# Patient Record
Sex: MALE | Race: WHITE | NOT HISPANIC OR LATINO | URBAN - METROPOLITAN AREA
[De-identification: names, ages, dates, MRNs, and addresses within clinical notes are randomized per-mention and may not be internally consistent; named-entity substitution may affect disease eponyms.]

---

## 2019-06-22 ENCOUNTER — INPATIENT (INPATIENT)
Facility: HOSPITAL | Age: 59
LOS: 8 days | Discharge: HOME HEALTH | DRG: 234 | End: 2019-07-01
Attending: THORACIC SURGERY (CARDIOTHORACIC VASCULAR SURGERY) | Admitting: THORACIC SURGERY (CARDIOTHORACIC VASCULAR SURGERY)
Payer: COMMERCIAL

## 2019-06-22 VITALS
SYSTOLIC BLOOD PRESSURE: 127 MMHG | RESPIRATION RATE: 18 BRPM | HEART RATE: 50 BPM | TEMPERATURE: 98 F | DIASTOLIC BLOOD PRESSURE: 80 MMHG | OXYGEN SATURATION: 97 %

## 2019-06-22 DIAGNOSIS — Z98.890 OTHER SPECIFIED POSTPROCEDURAL STATES: Chronic | ICD-10-CM

## 2019-06-22 LAB
ALBUMIN SERPL ELPH-MCNC: 4.2 G/DL — SIGNIFICANT CHANGE UP (ref 3.4–5)
ALP SERPL-CCNC: 77 U/L — SIGNIFICANT CHANGE UP (ref 40–120)
ALT FLD-CCNC: 26 U/L — SIGNIFICANT CHANGE UP (ref 12–42)
ANION GAP SERPL CALC-SCNC: 13 MMOL/L — SIGNIFICANT CHANGE UP (ref 9–16)
APTT BLD: 31.3 SEC — SIGNIFICANT CHANGE UP (ref 27.5–36.3)
AST SERPL-CCNC: 24 U/L — SIGNIFICANT CHANGE UP (ref 15–37)
BASOPHILS NFR BLD AUTO: 0.3 % — SIGNIFICANT CHANGE UP (ref 0–2)
BILIRUB SERPL-MCNC: 0.4 MG/DL — SIGNIFICANT CHANGE UP (ref 0.2–1.2)
BUN SERPL-MCNC: 20 MG/DL — SIGNIFICANT CHANGE UP (ref 7–23)
CALCIUM SERPL-MCNC: 9.5 MG/DL — SIGNIFICANT CHANGE UP (ref 8.5–10.5)
CHLORIDE SERPL-SCNC: 107 MMOL/L — SIGNIFICANT CHANGE UP (ref 96–108)
CO2 SERPL-SCNC: 22 MMOL/L — SIGNIFICANT CHANGE UP (ref 22–31)
CREAT SERPL-MCNC: 1.02 MG/DL — SIGNIFICANT CHANGE UP (ref 0.5–1.3)
D DIMER BLD IA.RAPID-MCNC: 419 NG/ML DDU — HIGH
EOSINOPHIL NFR BLD AUTO: 1.8 % — SIGNIFICANT CHANGE UP (ref 0–6)
GLUCOSE SERPL-MCNC: 100 MG/DL — HIGH (ref 70–99)
HCT VFR BLD CALC: 43.1 % — SIGNIFICANT CHANGE UP (ref 39–50)
HGB BLD-MCNC: 14.6 G/DL — SIGNIFICANT CHANGE UP (ref 13–17)
IMM GRANULOCYTES NFR BLD AUTO: 0.3 % — SIGNIFICANT CHANGE UP (ref 0–1.5)
INR BLD: 1 — SIGNIFICANT CHANGE UP (ref 0.88–1.16)
LIDOCAIN IGE QN: 145 U/L — SIGNIFICANT CHANGE UP (ref 73–393)
LYMPHOCYTES # BLD AUTO: 25.4 % — SIGNIFICANT CHANGE UP (ref 13–44)
MCHC RBC-ENTMCNC: 29.9 PG — SIGNIFICANT CHANGE UP (ref 27–34)
MCHC RBC-ENTMCNC: 33.9 G/DL — SIGNIFICANT CHANGE UP (ref 32–36)
MCV RBC AUTO: 88.3 FL — SIGNIFICANT CHANGE UP (ref 80–100)
MONOCYTES NFR BLD AUTO: 9.5 % — SIGNIFICANT CHANGE UP (ref 2–14)
NEUTROPHILS NFR BLD AUTO: 62.7 % — SIGNIFICANT CHANGE UP (ref 43–77)
NT-PROBNP SERPL-SCNC: 41 PG/ML — SIGNIFICANT CHANGE UP
PLATELET # BLD AUTO: 272 K/UL — SIGNIFICANT CHANGE UP (ref 150–400)
POTASSIUM SERPL-MCNC: 4.1 MMOL/L — SIGNIFICANT CHANGE UP (ref 3.5–5.3)
POTASSIUM SERPL-SCNC: 4.1 MMOL/L — SIGNIFICANT CHANGE UP (ref 3.5–5.3)
PROT SERPL-MCNC: 7.7 G/DL — SIGNIFICANT CHANGE UP (ref 6.4–8.2)
PROTHROM AB SERPL-ACNC: 11.1 SEC — SIGNIFICANT CHANGE UP (ref 10–12.9)
RBC # BLD: 4.88 M/UL — SIGNIFICANT CHANGE UP (ref 4.2–5.8)
RBC # FLD: 13.1 % — SIGNIFICANT CHANGE UP (ref 10.3–14.5)
SODIUM SERPL-SCNC: 142 MMOL/L — SIGNIFICANT CHANGE UP (ref 132–145)
TROPONIN I SERPL-MCNC: 0.02 NG/ML — SIGNIFICANT CHANGE UP (ref 0.02–0.06)
TROPONIN I SERPL-MCNC: 0.29 NG/ML — HIGH (ref 0.02–0.06)
WBC # BLD: 8.7 K/UL — SIGNIFICANT CHANGE UP (ref 3.8–10.5)
WBC # FLD AUTO: 8.7 K/UL — SIGNIFICANT CHANGE UP (ref 3.8–10.5)

## 2019-06-22 PROCEDURE — 99285 EMERGENCY DEPT VISIT HI MDM: CPT | Mod: 25

## 2019-06-22 PROCEDURE — 93010 ELECTROCARDIOGRAM REPORT: CPT | Mod: NC

## 2019-06-22 PROCEDURE — 71275 CT ANGIOGRAPHY CHEST: CPT | Mod: 26

## 2019-06-22 PROCEDURE — 71046 X-RAY EXAM CHEST 2 VIEWS: CPT | Mod: 26

## 2019-06-22 RX ORDER — HEPARIN SODIUM 5000 [USP'U]/ML
4000 INJECTION INTRAVENOUS; SUBCUTANEOUS ONCE
Refills: 0 | Status: COMPLETED | OUTPATIENT
Start: 2019-06-22 | End: 2019-06-22

## 2019-06-22 RX ORDER — HEPARIN SODIUM 5000 [USP'U]/ML
INJECTION INTRAVENOUS; SUBCUTANEOUS
Qty: 25000 | Refills: 0 | Status: DISCONTINUED | OUTPATIENT
Start: 2019-06-22 | End: 2019-06-23

## 2019-06-22 RX ORDER — ASPIRIN/CALCIUM CARB/MAGNESIUM 324 MG
325 TABLET ORAL ONCE
Refills: 0 | Status: COMPLETED | OUTPATIENT
Start: 2019-06-22 | End: 2019-06-22

## 2019-06-22 RX ORDER — HEPARIN SODIUM 5000 [USP'U]/ML
4000 INJECTION INTRAVENOUS; SUBCUTANEOUS EVERY 6 HOURS
Refills: 0 | Status: DISCONTINUED | OUTPATIENT
Start: 2019-06-22 | End: 2019-06-24

## 2019-06-22 RX ORDER — ATORVASTATIN CALCIUM 80 MG/1
80 TABLET, FILM COATED ORAL ONCE
Refills: 0 | Status: COMPLETED | OUTPATIENT
Start: 2019-06-22 | End: 2019-06-22

## 2019-06-22 RX ORDER — FAMOTIDINE 10 MG/ML
20 INJECTION INTRAVENOUS ONCE
Refills: 0 | Status: COMPLETED | OUTPATIENT
Start: 2019-06-22 | End: 2019-06-22

## 2019-06-22 RX ADMIN — ATORVASTATIN CALCIUM 80 MILLIGRAM(S): 80 TABLET, FILM COATED ORAL at 23:19

## 2019-06-22 RX ADMIN — HEPARIN SODIUM 1000 UNIT(S)/HR: 5000 INJECTION INTRAVENOUS; SUBCUTANEOUS at 23:20

## 2019-06-22 RX ADMIN — Medication 30 MILLILITER(S): at 20:27

## 2019-06-22 RX ADMIN — HEPARIN SODIUM 4000 UNIT(S): 5000 INJECTION INTRAVENOUS; SUBCUTANEOUS at 23:20

## 2019-06-22 RX ADMIN — Medication 325 MILLIGRAM(S): at 18:34

## 2019-06-22 RX ADMIN — FAMOTIDINE 20 MILLIGRAM(S): 10 INJECTION INTRAVENOUS at 20:27

## 2019-06-22 NOTE — ED PROVIDER NOTE - DIAGNOSTIC INTERPRETATION
ER Physician: Dr. Shaffer   CXR (2 views) INTERPRETATION: lungs clear, heart shadow normal, bony structures intact

## 2019-06-22 NOTE — ED ADULT NURSE NOTE - CHPI ED NUR SYMPTOMS NEG
no dizziness/no diaphoresis/no syncope/no chills/no shortness of breath/no congestion/no vomiting/no fever/no back pain/no nausea

## 2019-06-22 NOTE — ED PROVIDER NOTE - PROGRESS NOTE DETAILS
unchanged EKG, sinus izabel, RBBB. Will wait for second trop for dispo. pt feels better after GI cocktail. troponin elevated significantly on repeat, will admit for R/O ACS. Explained to pt, understands and agreeable w  plan. Started heparin drip, dose of lipitor. Discussed w Dr Hamilton from Cardio who accepts admission under his name to Cardio Tele. troponin elevated significantly on repeat, will admit for R/O ACS. Explained to pt, understands and agreeable w  plan. Started heparin drip, dose of lipitor. Discussed w Dr Mejias from Cardio who accepts admission under his name to Cardio Tele.

## 2019-06-22 NOTE — ED ADULT NURSE REASSESSMENT NOTE - NS ED NURSE REASSESS COMMENT FT1
Received patient from [facility]. Patient resting in stretcher, nad on cardiac telemetry. AOx4, states "CP is better, feels more gassy now". Denies SoB, headache, dizziness, N/V. MD made aware, will continue to monitor.

## 2019-06-22 NOTE — ED ADULT NURSE NOTE - OBJECTIVE STATEMENT
59 y/o male c/o chest pain non radiating- pt denies sob, dizziness at this time- Pt is A+Ox3 and states his brother who is 18 months older than him had a MI recently

## 2019-06-22 NOTE — ED PROVIDER NOTE - OBJECTIVE STATEMENT
57 y/o M with PMHx of HTN and ADHD presents to ED with c/o chest discomfort since 1 hour ago. Pt describes Sx as a burning / pressure sensation. Pt reports Sx began when he was walking today and went to sit down for a pedicure. He states he also experienced sweating after sitting down. Pt also states he had similar Sx on Thursday, but was not as intense. Pt denies any SOB or any smoking Hx. Pt admits Sx has subsided significantly on exam.     Pt takes Losartan and Adermal. 57 y/o M with PMHx of HTN and ADHD presents to ED with c/o chest discomfort since 1 hour ago. Pt describes Sx as a burning / pressure sensation. Pt reports Sx began when he was walking today and went to sit down for a pedicure. He states he also experienced sweating after sitting down. Pt also states he had similar Sx on Thursday, but was not as intense. Pt denies any SOB or any smoking Hx. Pt admits Sx has subsided significantly on exam.     Pt takes Losartan and adderal.

## 2019-06-22 NOTE — ED ADULT TRIAGE NOTE - CHIEF COMPLAINT QUOTE
pt states midsternal chest pain/ burning. states no cardiac hx  brother around the same age had a stent placed

## 2019-06-22 NOTE — ED PROVIDER NOTE - CLINICAL SUMMARY MEDICAL DECISION MAKING FREE TEXT BOX
57 y/o M presenting with c/o chest discomfort for at least 1 hour PTA. Will do CP workup including troponin and D-dimers.  Pt is well appearing cornel. Explained to Pt possibility of trending troponin given early presentation. Will order a dose of ASA for possible ACS and order a CXR. Will reassess afterwards. 59 y/o M presenting with c/o chest discomfort for at least 1 hour PTA. Will do CP workup including troponin and D-dimers.  Pt is well appearing cornel. Explained to Pt possibility of trending troponin given early presentation. Will order a dose of ASA for possible ACS.   Update: D-dimer is elevated. Will order CTA chest to r/o PE.

## 2019-06-23 DIAGNOSIS — I10 ESSENTIAL (PRIMARY) HYPERTENSION: ICD-10-CM

## 2019-06-23 DIAGNOSIS — Z29.9 ENCOUNTER FOR PROPHYLACTIC MEASURES, UNSPECIFIED: ICD-10-CM

## 2019-06-23 DIAGNOSIS — R73.03 PREDIABETES: ICD-10-CM

## 2019-06-23 DIAGNOSIS — I24.9 ACUTE ISCHEMIC HEART DISEASE, UNSPECIFIED: ICD-10-CM

## 2019-06-23 DIAGNOSIS — F90.9 ATTENTION-DEFICIT HYPERACTIVITY DISORDER, UNSPECIFIED TYPE: ICD-10-CM

## 2019-06-23 LAB
ALBUMIN SERPL ELPH-MCNC: 4.1 G/DL — SIGNIFICANT CHANGE UP (ref 3.3–5)
ALP SERPL-CCNC: 67 U/L — SIGNIFICANT CHANGE UP (ref 40–120)
ALT FLD-CCNC: 17 U/L — SIGNIFICANT CHANGE UP (ref 10–45)
ANION GAP SERPL CALC-SCNC: 10 MMOL/L — SIGNIFICANT CHANGE UP (ref 5–17)
APTT BLD: 105 SEC — HIGH (ref 27.5–36.3)
APTT BLD: 33.5 SEC — SIGNIFICANT CHANGE UP (ref 27.5–36.3)
APTT BLD: 47.9 SEC — HIGH (ref 27.5–36.3)
AST SERPL-CCNC: 21 U/L — SIGNIFICANT CHANGE UP (ref 10–40)
BILIRUB SERPL-MCNC: 0.6 MG/DL — SIGNIFICANT CHANGE UP (ref 0.2–1.2)
BUN SERPL-MCNC: 14 MG/DL — SIGNIFICANT CHANGE UP (ref 7–23)
CALCIUM SERPL-MCNC: 9.1 MG/DL — SIGNIFICANT CHANGE UP (ref 8.4–10.5)
CHLORIDE SERPL-SCNC: 108 MMOL/L — SIGNIFICANT CHANGE UP (ref 96–108)
CHOLEST SERPL-MCNC: 137 MG/DL — SIGNIFICANT CHANGE UP (ref 10–199)
CK MB CFR SERPL CALC: 6.4 NG/ML — SIGNIFICANT CHANGE UP (ref 0–6.7)
CK MB CFR SERPL CALC: 7.2 NG/ML — HIGH (ref 0–6.7)
CK SERPL-CCNC: 157 U/L — SIGNIFICANT CHANGE UP (ref 30–200)
CK SERPL-CCNC: 159 U/L — SIGNIFICANT CHANGE UP (ref 30–200)
CO2 SERPL-SCNC: 23 MMOL/L — SIGNIFICANT CHANGE UP (ref 22–31)
CREAT SERPL-MCNC: 0.89 MG/DL — SIGNIFICANT CHANGE UP (ref 0.5–1.3)
GLUCOSE SERPL-MCNC: 95 MG/DL — SIGNIFICANT CHANGE UP (ref 70–99)
HBA1C BLD-MCNC: 5.8 % — HIGH (ref 4–5.6)
HCT VFR BLD CALC: 43.9 % — SIGNIFICANT CHANGE UP (ref 39–50)
HDLC SERPL-MCNC: 44 MG/DL — SIGNIFICANT CHANGE UP
HGB BLD-MCNC: 14.4 G/DL — SIGNIFICANT CHANGE UP (ref 13–17)
LIPID PNL WITH DIRECT LDL SERPL: 80 MG/DL — SIGNIFICANT CHANGE UP
MAGNESIUM SERPL-MCNC: 2.2 MG/DL — SIGNIFICANT CHANGE UP (ref 1.6–2.6)
MCHC RBC-ENTMCNC: 29.8 PG — SIGNIFICANT CHANGE UP (ref 27–34)
MCHC RBC-ENTMCNC: 32.8 GM/DL — SIGNIFICANT CHANGE UP (ref 32–36)
MCV RBC AUTO: 90.7 FL — SIGNIFICANT CHANGE UP (ref 80–100)
NRBC # BLD: 0 /100 WBCS — SIGNIFICANT CHANGE UP (ref 0–0)
PCP SPEC-MCNC: SIGNIFICANT CHANGE UP
PLATELET # BLD AUTO: 253 K/UL — SIGNIFICANT CHANGE UP (ref 150–400)
POTASSIUM SERPL-MCNC: 3.8 MMOL/L — SIGNIFICANT CHANGE UP (ref 3.5–5.3)
POTASSIUM SERPL-SCNC: 3.8 MMOL/L — SIGNIFICANT CHANGE UP (ref 3.5–5.3)
PROT SERPL-MCNC: 6.7 G/DL — SIGNIFICANT CHANGE UP (ref 6–8.3)
RBC # BLD: 4.84 M/UL — SIGNIFICANT CHANGE UP (ref 4.2–5.8)
RBC # FLD: 12.8 % — SIGNIFICANT CHANGE UP (ref 10.3–14.5)
SODIUM SERPL-SCNC: 141 MMOL/L — SIGNIFICANT CHANGE UP (ref 135–145)
TOTAL CHOLESTEROL/HDL RATIO MEASUREMENT: 3.1 RATIO — LOW (ref 3.4–9.6)
TRIGL SERPL-MCNC: 67 MG/DL — SIGNIFICANT CHANGE UP (ref 10–149)
TROPONIN T SERPL-MCNC: 0.07 NG/ML — CRITICAL HIGH (ref 0–0.01)
TROPONIN T SERPL-MCNC: 0.15 NG/ML — CRITICAL HIGH (ref 0–0.01)
WBC # BLD: 6.76 K/UL — SIGNIFICANT CHANGE UP (ref 3.8–10.5)
WBC # FLD AUTO: 6.76 K/UL — SIGNIFICANT CHANGE UP (ref 3.8–10.5)

## 2019-06-23 RX ORDER — ATORVASTATIN CALCIUM 80 MG/1
80 TABLET, FILM COATED ORAL AT BEDTIME
Refills: 0 | Status: DISCONTINUED | OUTPATIENT
Start: 2019-06-23 | End: 2019-07-01

## 2019-06-23 RX ORDER — LOSARTAN POTASSIUM 100 MG/1
25 TABLET, FILM COATED ORAL DAILY
Refills: 0 | Status: DISCONTINUED | OUTPATIENT
Start: 2019-06-23 | End: 2019-06-24

## 2019-06-23 RX ORDER — TICAGRELOR 90 MG/1
180 TABLET ORAL ONCE
Refills: 0 | Status: COMPLETED | OUTPATIENT
Start: 2019-06-23 | End: 2019-06-23

## 2019-06-23 RX ORDER — POTASSIUM CHLORIDE 20 MEQ
40 PACKET (EA) ORAL ONCE
Refills: 0 | Status: COMPLETED | OUTPATIENT
Start: 2019-06-23 | End: 2019-06-23

## 2019-06-23 RX ORDER — ASPIRIN/CALCIUM CARB/MAGNESIUM 324 MG
81 TABLET ORAL DAILY
Refills: 0 | Status: DISCONTINUED | OUTPATIENT
Start: 2019-06-23 | End: 2019-06-27

## 2019-06-23 RX ORDER — HEPARIN SODIUM 5000 [USP'U]/ML
1300 INJECTION INTRAVENOUS; SUBCUTANEOUS
Qty: 25000 | Refills: 0 | Status: DISCONTINUED | OUTPATIENT
Start: 2019-06-23 | End: 2019-06-24

## 2019-06-23 RX ORDER — DEXTROAMPHETAMINE SACCHARATE, AMPHETAMINE ASPARTATE, DEXTROAMPHETAMINE SULFATE AND AMPHETAMINE SULFATE 1.875; 1.875; 1.875; 1.875 MG/1; MG/1; MG/1; MG/1
20 TABLET ORAL
Refills: 0 | Status: DISCONTINUED | OUTPATIENT
Start: 2019-06-23 | End: 2019-06-23

## 2019-06-23 RX ORDER — TICAGRELOR 90 MG/1
90 TABLET ORAL EVERY 12 HOURS
Refills: 0 | Status: DISCONTINUED | OUTPATIENT
Start: 2019-06-23 | End: 2019-06-24

## 2019-06-23 RX ORDER — POTASSIUM CHLORIDE 20 MEQ
20 PACKET (EA) ORAL ONCE
Refills: 0 | Status: DISCONTINUED | OUTPATIENT
Start: 2019-06-23 | End: 2019-06-23

## 2019-06-23 RX ADMIN — HEPARIN SODIUM 1250 UNIT(S)/HR: 5000 INJECTION INTRAVENOUS; SUBCUTANEOUS at 23:02

## 2019-06-23 RX ADMIN — HEPARIN SODIUM 4000 UNIT(S): 5000 INJECTION INTRAVENOUS; SUBCUTANEOUS at 05:45

## 2019-06-23 RX ADMIN — TICAGRELOR 90 MILLIGRAM(S): 90 TABLET ORAL at 11:42

## 2019-06-23 RX ADMIN — HEPARIN SODIUM 1500 UNIT(S)/HR: 5000 INJECTION INTRAVENOUS; SUBCUTANEOUS at 12:39

## 2019-06-23 RX ADMIN — ATORVASTATIN CALCIUM 80 MILLIGRAM(S): 80 TABLET, FILM COATED ORAL at 22:42

## 2019-06-23 RX ADMIN — Medication 81 MILLIGRAM(S): at 11:42

## 2019-06-23 RX ADMIN — HEPARIN SODIUM 1300 UNIT(S)/HR: 5000 INJECTION INTRAVENOUS; SUBCUTANEOUS at 05:46

## 2019-06-23 RX ADMIN — LOSARTAN POTASSIUM 25 MILLIGRAM(S): 100 TABLET, FILM COATED ORAL at 09:46

## 2019-06-23 RX ADMIN — TICAGRELOR 180 MILLIGRAM(S): 90 TABLET ORAL at 02:37

## 2019-06-23 RX ADMIN — Medication 40 MILLIEQUIVALENT(S): at 09:46

## 2019-06-23 RX ADMIN — HEPARIN SODIUM 0 UNIT(S)/HR: 5000 INJECTION INTRAVENOUS; SUBCUTANEOUS at 21:29

## 2019-06-23 RX ADMIN — TICAGRELOR 90 MILLIGRAM(S): 90 TABLET ORAL at 22:41

## 2019-06-23 NOTE — H&P ADULT - RS GEN PE MLT RESP DETAILS PC
clear to auscultation bilaterally/no chest wall tenderness/no intercostal retractions/no rales/no wheezes/no rhonchi/respirations non-labored

## 2019-06-23 NOTE — PROGRESS NOTE ADULT - PROBLEM SELECTOR PLAN 4
Holding home Adderall 20mg QD at this time    DVT PPX: on Heparin gtt  Dispo: - Holding home Adderall 20mg QD

## 2019-06-23 NOTE — PROGRESS NOTE ADULT - PROBLEM SELECTOR PLAN 5
F: Oral intake  E: Replete electrolytes as needed for K<4 and Mg<2  N: DASH Diet    DVT PPX: SQH / Ambulatory / Held in setting of Catheterization  Code Status: FULL CODE  Dispo: Trend Cardiac enzymes and discuss cardiac cath with  in AM     Case discussed with Dr. Mejias F: Oral intake, NPO after midnight  E: Replete electrolytes as needed for K<4 and Mg<2  N: DASH Diet, NPO after midnight    DVT PPX: on Heparin gtt, Ambulatory  Dispo: Pending clinical progression post-Cath likely Tuesday, no needs    Case discussed with Dr. Mejias

## 2019-06-23 NOTE — PROGRESS NOTE ADULT - SUBJECTIVE AND OBJECTIVE BOX
Pt is s/p  chest pain and feeling  warm    ? diaphoresis     GERD  yesterday     He is athletic  ,a runner   etc     previous cardiac w/u neg  one yr ago pre orthopedic intervention    PAST MEDICAL & SURGICAL HISTORY:  Prediabetes  ADHD (attention deficit hyperactivity disorder)  HTN (hypertension)  H/O rotator cuff surgery    MEDICATIONS  (STANDING):  aspirin enteric coated 81 milliGRAM(s) Oral daily  atorvastatin 80 milliGRAM(s) Oral at bedtime  heparin  Infusion. 1300 Unit(s)/Hr (13 mL/Hr) IV Continuous <Continuous>  losartan 25 milliGRAM(s) Oral daily  ticagrelor 90 milliGRAM(s) Oral every 12 hours    ICU Vital Signs Last 24 Hrs  T(C): 36.8 (23 Jun 2019 10:33), Max: 36.8 (22 Jun 2019 21:55)  T(F): 98.2 (23 Jun 2019 10:33), Max: 98.2 (22 Jun 2019 21:55)  HR: 71 (23 Jun 2019 09:09) (50 - 71)  BP: 139/84 (23 Jun 2019 09:09) (122/78 - 139/87)  BP(mean): --  ABP: --  ABP(mean): --  RR: 16 (23 Jun 2019 09:09) (16 - 18)  SpO2: 97% (23 Jun 2019 09:09) (97% - 98%)    MEDICATIONS  (PRN):  heparin  Injectable 4000 Unit(s) IV Push every 6 hours PRN For aPTT less than 40    Lungs clear   CV   s1 s2  abd soft    ext stable                          14.6   8.7   )-----------( 272      ( 22 Jun 2019 18:28 )             43.1   06-23    141  |  108  |  14  ----------------------------<  95  3.8   |  23  |  0.89    Ca    9.1      23 Jun 2019 05:04  Mg     2.2     06-23    TPro  6.7  /  Alb  4.1  /  TBili  0.6  /  DBili  x   /  AST  21  /  ALT  17  /  AlkPhos  67  06-23  PT/INR - ( 22 Jun 2019 18:28 )   PT: 11.1 sec;   INR: 1.00          PTT - ( 23 Jun 2019 05:05 )  PTT:33.5 sec    CARDIAC MARKERS ( 23 Jun 2019 05:04 )  x     / 0.15 ng/mL / 157 U/L / x     / 7.2 ng/mL  CARDIAC MARKERS ( 22 Jun 2019 22:09 )  0.295 ng/mL / x     / x     / x     / x      CARDIAC MARKERS ( 22 Jun 2019 18:28 )  0.020 ng/mL / x     / x     / x     / x        EKG NSR  RBBB

## 2019-06-23 NOTE — PROGRESS NOTE ADULT - ASSESSMENT
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 Pt describes chest discomfort as burning/pressure sensation. Pt reports his symptoms began when he was walking today and went to sit down for pedicure. Pt also states he had similar episode on Thursday, but was not as intense. Pt states most recent Stress Test was ~ 2 years ago as part of pre-op testing for shoulder surgery and was reportedly normal. He denies any recent fevers, chills, cough, SOB, n/v/d, abdominal pain, LE edema, or any other symptoms at this time. On arrival to [facility] ED, VS noted as T 98, HR 50, /80, RR 18, SpO2 97% on RA. Labs significant for Trop I 0.02->0.295, D-Dimer 419, EKG SB @ 57 BPM w/ RBBB and TWI in III. CTA Chest negative for PE. Pt given ASA 325mg PO x 1, Pepcid 20mg IV x 1, Maalox 30mL PO x 1, and started on Heparin gtt prior to transfer to [facility]. Pt is now admitted to [facility] for further management of ACS. On arrival to [facility], pt CP free and HD stable without complaints. 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 CTA Chest negative for PE, loaded with Brilinta and Aspirin, NPO for Cath in AM.

## 2019-06-23 NOTE — H&P ADULT - NSHPLABSRESULTS_GEN_ALL_CORE
14.6   8.7   )-----------( 272      ( 22 Jun 2019 18:28 )             43.1     PT/INR - ( 22 Jun 2019 18:28 )   PT: 11.1 sec;   INR: 1.00          PTT - ( 22 Jun 2019 18:28 )  PTT:31.3 sec  06-22    142  |  107  |  20  ----------------------------<  100<H>  4.1   |  22  |  1.02    Ca    9.5      22 Jun 2019 18:28    TPro  7.7  /  Alb  4.2  /  TBili  0.4  /  DBili  x   /  AST  24  /  ALT  26  /  AlkPhos  77  06-22    CARDIAC MARKERS ( 22 Jun 2019 22:09 )  0.295 ng/mL / x     / x     / x     / x      CARDIAC MARKERS ( 22 Jun 2019 18:28 )  0.020 ng/mL / x     / x     / x     / x

## 2019-06-23 NOTE — H&P ADULT - PROBLEM SELECTOR PLAN 4
Continue home Adderall 20mg QD    DVT ppx: Heparin gtt  Dispo: Trend cardiac enzymes and discuss cardiac cath with Dr. Mejias in AM Holding home Adderall 20mg QD at this time    DVT ppx: Heparin gtt  Dispo: Trend cardiac enzymes and discuss cardiac cath with Dr. Mejias in AM

## 2019-06-23 NOTE — H&P ADULT - PROBLEM SELECTOR PLAN 1
Currently CP free and HD stable  - Trop I 0.02->0.295 at [facility], f/u AM Trop T and 12PM Trop T  -  EKG SB @ 57 BPM w/ RBBB and TWI in III. F/u AM EKG  - Loaded with ASA 325mg PO x 1, started Heparin gtt, given Lipitor 80mg PO x 1 at [facility] ED  - Loaded with Brilinta 180mg PO x 1 on arrival to [facility]  - Continue ASA 81mg QD, Brilinta 90mg BID, Lipitor 80mg QHS, Losartan 25mg QD, and Heparin gtt  - No BB at this time as pt persistently bradycardic. Consider starting in AM  - Echo ordered, f/u results  - Discuss cardiac cath/ischemic w/u with Dr Mejias in AM    #Elevated D-Dimer 419 on admission  - CTA Chest negative for PE Currently CP free and HD stable  - Trop I 0.02->0.295 at [facility], f/u AM Trop T and 12PM Trop T  -  EKG SB @ 57 BPM w/ RBBB and TWI in III. F/u AM EKG  - Loaded with ASA 325mg PO x 1, started Heparin gtt, given Lipitor 80mg PO x 1 at [facility] ED  - Loaded with Brilinta 180mg PO x 1 on arrival to [facility]  - Continue ASA 81mg QD, Brilinta 90mg BID, Lipitor 80mg QHS, Losartan 25mg QD, and Heparin gtt  - No BB at this time as pt persistently bradycardic. Consider starting in AM  - Echo ordered, f/u results  - Discuss timing of cardiac cath with Dr Mejias in AM    #Elevated D-Dimer 419 on admission  - CTA Chest negative for PE

## 2019-06-23 NOTE — PROGRESS NOTE ADULT - PROBLEM SELECTOR PLAN 1
Trop I 0.02>0.295> Trop T 0.15  Currently CP free and HD stable  - Trop I 0.02->0.295 at [facility], f/u AM Trop T and 12PM Trop T  -  EKG SB @ 57 BPM w/ RBBB and TWI in III. F/u AM EKG  - Loaded with ASA 325mg PO x 1, started Heparin gtt, given Lipitor 80mg PO x 1 at [facility] ED  - Loaded with Brilinta 180mg PO x 1 on arrival to [facility]  - Continue ASA 81mg QD, Brilinta 90mg BID, Lipitor 80mg QHS, Losartan 25mg QD, and Heparin gtt  - No BB at this time as pt persistently bradycardic. Consider starting in AM  - Echo ordered, f/u results  - Discuss timing of cardiac cath with Dr Mejias in AM    #Elevated D-Dimer 419 on admission  - CTA Chest negative for PE Trop I 0.02>0.295>Trop T 0.15, EKG SB @ 57 BPM w/ RBBB and TWI in III, s/p Brilinta and Aspirin load, currently chest pain free and hemodynamically stable  [ ] F/u Trop at 12, Utox  [ ] NPO, consented, and loaded for Cath in AM, please add to schedule  [ ] F/u Echocardiogram  - Continue Heparin gtt  - Holding initiation of BB at this time given baseline HR 50's (very active, biker), Atorvastatin 80mg QHS initiated    #Elevated D-Dimer 419 on admission  - CTA Chest negative for PE

## 2019-06-23 NOTE — H&P ADULT - ASSESSMENT
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 Pt describes chest discomfort as burning/pressure sensation. Pt reports his symptoms began when he was walking today and went to sit down for pedicure. Pt also states he had similar episode on Thursday, but was not as intense. Pt states most recent Stress Test was ~ 2 years ago as part of pre-op testing for shoulder surgery and was reportedly normal. He denies any recent fevers, chills, cough, SOB, n/v/d, abdominal pain, LE edema, or any other symptoms at this time. On arrival to [facility] ED, VS noted as T 98, HR 50, /80, RR 18, SpO2 97% on RA. Labs significant for Trop I 0.02->0.295, D-Dimer 419, EKG SB @ 57 BPM w/ RBBB and TWI in III. CTA Chest negative for PE. Pt given ASA 325mg PO x 1, Pepcid 20mg IV x 1, Maalox 30mL PO x 1, and started on Heparin gtt prior to transfer to [facility]. Pt is now admitted to [facility] for further management of ACS. On arrival to [facility], pt CP free and HD stable without complaints.

## 2019-06-23 NOTE — H&P ADULT - HISTORY OF PRESENT ILLNESS
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 Pt describes chest discomfort as burning/pressure sensation. Pt reports his symptoms began when he was walking today and went to sit down for pedicure. Pt also states he had similar episode on Thursday, but was not as intense. 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 Pt describes chest discomfort as burning/pressure sensation. Pt reports his symptoms began when he was walking today and went to sit down for pedicure. Pt also states he had similar episode on Thursday, but was not as intense. Pt states most recent Stress Test was ~ 2 years ago as part of pre-op testing for shoulder surgery and was reportedly normal. He denies any recent fevers, chills, cough, SOB, n/v/d, abdominal pain, LE edema, or any other symptoms at this time. On arrival to [facility] ED, VS noted as T 98, HR 50, /80, RR 18, SpO2 97% on RA. Labs significant for Trop I 0.02->0.295, D-Dimer 419, EKG SB @ 57 BPM w/ RBBB and TWI in III. CTA Chest negative for PE. Pt given ASA 325mg PO x 1, Pepcid 20mg IV x 1, Maalox 30mL PO x 1, and started on Heparin gtt prior to transfer to [facility]. Pt is now admitted to [facility] for further management of ACS. On arrival to [facility], pt CP free and HD stable without complaints.

## 2019-06-23 NOTE — H&P ADULT - NSICDXPASTMEDICALHX_GEN_ALL_CORE_FT
PAST MEDICAL HISTORY:  ADHD (attention deficit hyperactivity disorder)     HTN (hypertension)     Prediabetes

## 2019-06-23 NOTE — PROGRESS NOTE ADULT - PROBLEM SELECTOR PLAN 2
Normotensive at this time  - Continue Losartan 25mg QD  - No BB at this time as pt persistently bradycardic. Consider starting in AM normotensive  - Continue home Losartan 25mg PO QD  - Holding initiation of BB at this time given baseline HR 50's (very active, biker)

## 2019-06-23 NOTE — H&P ADULT - PROBLEM SELECTOR PLAN 2
Normotensive at this time  - Continue Losartan 25mg QD  - No BB at this time as pt persistently bradycardic. Consider starting in AM

## 2019-06-23 NOTE — PROGRESS NOTE ADULT - SUBJECTIVE AND OBJECTIVE BOX
Interventional Cardiology PA Adult Progress Note    C.C.: Chest Pain    S: Patient states he is feeling okay this morning, denies chest pain and shortness of breath, curious about plan for Cath.  ROS negative except per Subjective and HPI.    O:  Medications:  heparin  Infusion. 1300 Unit(s)/Hr IV Continuous <Continuous>  ticagrelor 90 milliGRAM(s) Oral every 12 hours  aspirin enteric coated 81 milliGRAM(s) Oral daily  atorvastatin 80 milliGRAM(s) Oral at bedtime  losartan 25 milliGRAM(s) Oral daily    Vitals:  T(C): 36.8 (06-23-19 @ 10:33), Max: 36.8 (06-22-19 @ 21:55)  HR: 71 (06-23-19 @ 09:09) (50 - 71)  BP: 139/84 (06-23-19 @ 09:09) (122/78 - 139/87)  RR: 16 (06-23-19 @ 09:09) (16 - 18)  SpO2: 97% (06-23-19 @ 09:09) (97% - 98%)    I&O's Summary    23 Jun 2019 07:01  -  23 Jun 2019 11:31  --------------------------------------------------------  IN: 180 mL / OUT: 700 mL / NET: -520 mL    On Heparin gtt     Physical Exam:  Appearance: Normal  HEENT: Normal oral mucosa, EOMi, MELISSA  Neck: Supple, no JVD  Cardiovascular: Normal S1 S2, no murmurs appreciated  Respiratory: Lungs clear to auscultation  Gastrointestinal: Soft, non-tender, + BS  Skin: No rashes, ecchymoses, or cyanosis  Extremities: Normal range of motion, no clubbing, cyanosis, or edema  Vascular: Peripheral pulses: Carotid, Radial, Femoral, DP, PT 2+ bilaterally, no Carotid/Femoral bruits  Neurologic: Non-focal  Psychiatry: A&O x 3, mood and affect appropriate        Labs:	 	  Troponin I, Serum: 0.295 ng/mL (06-22 @ 22:09)  Troponin I, Serum: 0.020 ng/mL (06-22 @ 18:28)    Troponin I, Serum: 0.295 ng/mL (06-22 @ 22:09)  Troponin I, Serum: 0.020 ng/mL (06-22 @ 18:28)                14.6   8.7   )-----------( 272      ( 22 Jun 2019 18:28 )             43.1     141  |  108  |  14  ----------------------------<  95  3.8   |  23  |  0.89    Ca    9.1      23 Jun 2019 05:04    Mg     2.2     06-23    TPro  6.7  /  Alb  4.1  /  TBili  0.6  /  DBili  x   /  AST  21  /  ALT  17  /  AlkPhos  67  06-23    Serum Pro-Brain Natriuretic Peptide: 41 pg/mL (06-22 @ 18:28)    Hemoglobin A1C, Whole Blood: 5.8 % (06-23 @ 05:05)    PT/INR - ( 22 Jun 2019 18:28 )   PT: 11.1 sec;   INR: 1.00        PTT - ( 23 Jun 2019 05:05 )  PTT:33.5 sec

## 2019-06-24 DIAGNOSIS — R79.89 OTHER SPECIFIED ABNORMAL FINDINGS OF BLOOD CHEMISTRY: ICD-10-CM

## 2019-06-24 LAB
ALBUMIN SERPL ELPH-MCNC: 4.1 G/DL — SIGNIFICANT CHANGE UP (ref 3.3–5)
ALP SERPL-CCNC: 70 U/L — SIGNIFICANT CHANGE UP (ref 40–120)
ALT FLD-CCNC: 15 U/L — SIGNIFICANT CHANGE UP (ref 10–45)
ANION GAP SERPL CALC-SCNC: 10 MMOL/L — SIGNIFICANT CHANGE UP (ref 5–17)
APTT BLD: 56.9 SEC — HIGH (ref 27.5–36.3)
APTT BLD: 82 SEC — HIGH (ref 27.5–36.3)
AST SERPL-CCNC: 17 U/L — SIGNIFICANT CHANGE UP (ref 10–40)
BILIRUB SERPL-MCNC: 0.5 MG/DL — SIGNIFICANT CHANGE UP (ref 0.2–1.2)
BLD GP AB SCN SERPL QL: NEGATIVE — SIGNIFICANT CHANGE UP
BUN SERPL-MCNC: 15 MG/DL — SIGNIFICANT CHANGE UP (ref 7–23)
CALCIUM SERPL-MCNC: 9.4 MG/DL — SIGNIFICANT CHANGE UP (ref 8.4–10.5)
CHLORIDE SERPL-SCNC: 106 MMOL/L — SIGNIFICANT CHANGE UP (ref 96–108)
CO2 SERPL-SCNC: 24 MMOL/L — SIGNIFICANT CHANGE UP (ref 22–31)
CREAT SERPL-MCNC: 0.91 MG/DL — SIGNIFICANT CHANGE UP (ref 0.5–1.3)
GLUCOSE SERPL-MCNC: 117 MG/DL — HIGH (ref 70–99)
HCT VFR BLD CALC: 44.3 % — SIGNIFICANT CHANGE UP (ref 39–50)
HGB BLD-MCNC: 14.6 G/DL — SIGNIFICANT CHANGE UP (ref 13–17)
INR BLD: 1.04 — SIGNIFICANT CHANGE UP (ref 0.88–1.16)
MCHC RBC-ENTMCNC: 30.1 PG — SIGNIFICANT CHANGE UP (ref 27–34)
MCHC RBC-ENTMCNC: 33 GM/DL — SIGNIFICANT CHANGE UP (ref 32–36)
MCV RBC AUTO: 91.3 FL — SIGNIFICANT CHANGE UP (ref 80–100)
NRBC # BLD: 0 /100 WBCS — SIGNIFICANT CHANGE UP (ref 0–0)
PLATELET # BLD AUTO: 241 K/UL — SIGNIFICANT CHANGE UP (ref 150–400)
POTASSIUM SERPL-MCNC: 4 MMOL/L — SIGNIFICANT CHANGE UP (ref 3.5–5.3)
POTASSIUM SERPL-SCNC: 4 MMOL/L — SIGNIFICANT CHANGE UP (ref 3.5–5.3)
PROT SERPL-MCNC: 6.7 G/DL — SIGNIFICANT CHANGE UP (ref 6–8.3)
PROTHROM AB SERPL-ACNC: 11.8 SEC — SIGNIFICANT CHANGE UP (ref 10–12.9)
RBC # BLD: 4.85 M/UL — SIGNIFICANT CHANGE UP (ref 4.2–5.8)
RBC # FLD: 12.9 % — SIGNIFICANT CHANGE UP (ref 10.3–14.5)
RH IG SCN BLD-IMP: POSITIVE — SIGNIFICANT CHANGE UP
SODIUM SERPL-SCNC: 140 MMOL/L — SIGNIFICANT CHANGE UP (ref 135–145)
TSH SERPL-MCNC: 0.73 UIU/ML — SIGNIFICANT CHANGE UP (ref 0.35–4.94)
WBC # BLD: 7.16 K/UL — SIGNIFICANT CHANGE UP (ref 3.8–10.5)
WBC # FLD AUTO: 7.16 K/UL — SIGNIFICANT CHANGE UP (ref 3.8–10.5)

## 2019-06-24 PROCEDURE — 93880 EXTRACRANIAL BILAT STUDY: CPT | Mod: 26

## 2019-06-24 PROCEDURE — 99223 1ST HOSP IP/OBS HIGH 75: CPT

## 2019-06-24 PROCEDURE — 93458 L HRT ARTERY/VENTRICLE ANGIO: CPT | Mod: 26

## 2019-06-24 RX ORDER — HEPARIN SODIUM 5000 [USP'U]/ML
7000 INJECTION INTRAVENOUS; SUBCUTANEOUS EVERY 6 HOURS
Refills: 0 | Status: DISCONTINUED | OUTPATIENT
Start: 2019-06-24 | End: 2019-06-25

## 2019-06-24 RX ORDER — HEPARIN SODIUM 5000 [USP'U]/ML
3500 INJECTION INTRAVENOUS; SUBCUTANEOUS EVERY 6 HOURS
Refills: 0 | Status: DISCONTINUED | OUTPATIENT
Start: 2019-06-24 | End: 2019-06-25

## 2019-06-24 RX ORDER — SODIUM CHLORIDE 9 MG/ML
500 INJECTION INTRAMUSCULAR; INTRAVENOUS; SUBCUTANEOUS
Refills: 0 | Status: DISCONTINUED | OUTPATIENT
Start: 2019-06-24 | End: 2019-06-24

## 2019-06-24 RX ORDER — PANTOPRAZOLE SODIUM 20 MG/1
40 TABLET, DELAYED RELEASE ORAL
Refills: 0 | Status: DISCONTINUED | OUTPATIENT
Start: 2019-06-24 | End: 2019-06-27

## 2019-06-24 RX ORDER — HEPARIN SODIUM 5000 [USP'U]/ML
1300 INJECTION INTRAVENOUS; SUBCUTANEOUS
Qty: 25000 | Refills: 0 | Status: DISCONTINUED | OUTPATIENT
Start: 2019-06-25 | End: 2019-06-25

## 2019-06-24 RX ORDER — SODIUM CHLORIDE 9 MG/ML
500 INJECTION INTRAMUSCULAR; INTRAVENOUS; SUBCUTANEOUS
Refills: 0 | Status: DISCONTINUED | OUTPATIENT
Start: 2019-06-24 | End: 2019-06-25

## 2019-06-24 RX ADMIN — ATORVASTATIN CALCIUM 80 MILLIGRAM(S): 80 TABLET, FILM COATED ORAL at 21:14

## 2019-06-24 RX ADMIN — HEPARIN SODIUM 1150 UNIT(S)/HR: 5000 INJECTION INTRAVENOUS; SUBCUTANEOUS at 05:42

## 2019-06-24 RX ADMIN — SODIUM CHLORIDE 75 MILLILITER(S): 9 INJECTION INTRAMUSCULAR; INTRAVENOUS; SUBCUTANEOUS at 16:02

## 2019-06-24 RX ADMIN — HEPARIN SODIUM 1150 UNIT(S)/HR: 5000 INJECTION INTRAVENOUS; SUBCUTANEOUS at 12:43

## 2019-06-24 RX ADMIN — SODIUM CHLORIDE 50 MILLILITER(S): 9 INJECTION INTRAMUSCULAR; INTRAVENOUS; SUBCUTANEOUS at 07:59

## 2019-06-24 RX ADMIN — LOSARTAN POTASSIUM 25 MILLIGRAM(S): 100 TABLET, FILM COATED ORAL at 06:25

## 2019-06-24 RX ADMIN — Medication 81 MILLIGRAM(S): at 11:35

## 2019-06-24 RX ADMIN — SODIUM CHLORIDE 50 MILLILITER(S): 9 INJECTION INTRAMUSCULAR; INTRAVENOUS; SUBCUTANEOUS at 19:55

## 2019-06-24 RX ADMIN — TICAGRELOR 90 MILLIGRAM(S): 90 TABLET ORAL at 11:35

## 2019-06-24 NOTE — PROGRESS NOTE ADULT - PROBLEM SELECTOR PLAN 3
HgbA1c 5.8  - Not on Medications at home normotensive  - Continue home Losartan 25mg PO QD  - Holding initiation of BB at this time given baseline HR 50's (very active, biker) -130s  - Hold home Losartan 25mg PO QD in light of upcoming CTS procedure  - Holding initiation of BB at this time given baseline HR 50's (very active, biker)  - Can add Imdur vs. Hydralazine if agent needed

## 2019-06-24 NOTE — CONSULT NOTE ADULT - SUBJECTIVE AND OBJECTIVE BOX
Surgeon: CLOVIS Rushing    Requesting Physician: Markus Peng    HISTORY OF PRESENT ILLNESS (Need 4): **Patient not seen yet**  Patient is a 59 y/o physically active male, former drug use (cocaine/ecstasy/ETOH), sober x 25 years, with FHx of CAD (father, brother, and maternal side) and PMHx of RBBB, HTN, prediabetes and ADHD, previously on Truvada as PrEP (d/c about 1 week ago) who presented to [facility] ED on 6/22/19 c/o midsternal, non-radiating, chest discomfort, 7/10 in severity with associated diaphoresis and fatigue that began 1 hour prior to arrival to [facility] ED.  Pt describes chest discomfort as burning/pressure sensation.  He states that his symptoms began when he was walking.  Pt also states he had similar episode a few days ago but was not as intense.  His most recent stress test was ~2 years ago as part of pre-op testing for shoulder surgery and was reportedly normal.  He denies any recent fevers, chills, cough, SOB, n/v/d, abdominal pain, LE edema, or any other symptoms at this time. On admission, labs significant for Trop I 0.02->0.295, D-Dimer 419, EKG SB @ 57 BPM w/ RBBB and TWI in III.  CTA Chest negative for PE.  Pt received ASA 325mg PO x 1, Pepcid 20mg IV x 1, Maalox 30mL PO x 1, and started on Heparin gtt prior to transfer to [facility].  Pt was admitted to [facility] for further management of ACS, and cardiac cath was done today revealing severe CAD.  Dr. Rushing is being consulted for surgical evaluation.   Currently, patient is located in cath lab holding area, denies active CP, SOB, dizziness.        PAST MEDICAL & SURGICAL HISTORY:  Prediabetes  ADHD (attention deficit hyperactivity disorder)  HTN (hypertension)  H/O rotator cuff surgery      MEDICATIONS  (STANDING):  aspirin enteric coated 81 milliGRAM(s) Oral daily  atorvastatin 80 milliGRAM(s) Oral at bedtime  ticagrelor 90 milliGRAM(s) Oral every 12 hours    MEDICATIONS  (PRN):  heparin  Injectable 4000 Unit(s) IV Push every 6 hours PRN For aPTT less than 40      Allergies    No Known Allergies    Intolerances        SOCIAL HISTORY:  Smoker:  YES Former, quit 25 years ago  ETOH use:  YES quit 25 years ago  Ilicit Drug use:  YES ecstacy and cocaine, quit 25 years ago  Occupation:  Assisted device use (Cane / Walker):  Live with:    FAMILY HISTORY:  FH: CAD (coronary artery disease)      Review of Systems (Need 10):  CONSTITUTIONAL: Denies fevers / chills, sweats, fatigue, weight loss, weight gain                                       NEURO:  Denies parathesias, seizures, syncope, confusion                                                                                  EYES:  Denies blurry vision, discharge, pain, loss of vision                                                                                    ENMT:  Denies difficulty hearing, vertigo, dysphagia, epistaxis, recent dental work                                       CV:  +Chest pain; Denies palpitations, EDEN, orthopnea                                                                                           RESPIRATORY:  Denies Wheezing, SOB, cough / sputum, hemoptysis                                                               GI:  Denies nausea, vomiting, diarrhea, constipation, melena                                                                          : Denies hematuria, dysuria, urgency, incontinence                                                                                          MUSKULOSKELETAL:  Denies arthritis, joint swelling, muscle weakness                                                             SKIN/BREAST:  Denies rash, itching, hair loss, masses                                                                                              PSYCH:  Denies depression, anxiety, suicidal ideation                                                                                                HEME/LYMPH:  Denies bruises easily, enlarged lymph nodes, tender lymph nodes                                          ENDOCRINE:  Denies cold intolerance, heat intolerance, polydipsia                                                                      Vital Signs Last 24 Hrs  T(C): 37 (24 Jun 2019 13:56), Max: 37 (24 Jun 2019 13:56)  T(F): 98.6 (24 Jun 2019 13:56), Max: 98.6 (24 Jun 2019 13:56)  HR: 55 (24 Jun 2019 05:44) (52 - 64)  BP: 112/67 (24 Jun 2019 05:44) (104/62 - 123/78)  BP(mean): --  RR: 17 (24 Jun 2019 05:44) (16 - 17)  SpO2: 96% (24 Jun 2019 05:44) (96% - 99%)    Physical Exam (Need 8)  GEN: NAD, looks comfortable  Psych: Mood appropriate  Neuro: A&Ox3.  No focal deficits.  Moving all extremities.   HEENT: No obvious abnormalities  CV: S1S2, regular, no murmurs appreciated.  No carotid bruits.  No JVD  Lungs: Clear B/L.  No wheezing, rales or rhonchi  ABD: Soft, non-tender, non-distended.  +Bowel sounds  EXT: Warm and well perfused.  No peripheral edema noted  Musculoskeletal: Moving all extremities with normal ROM, no joint swelling  PV: Pedal pulses palpable    LABS:                        14.6   7.16  )-----------( 241      ( 24 Jun 2019 04:58 )             44.3     06-24    140  |  106  |  15  ----------------------------<  117<H>  4.0   |  24  |  0.91    Ca    9.4      24 Jun 2019 04:58  Mg     2.2     06-23    TPro  6.7  /  Alb  4.1  /  TBili  0.5  /  DBili  x   /  AST  17  /  ALT  15  /  AlkPhos  70  06-24    PT/INR - ( 24 Jun 2019 04:58 )   PT: 11.8 sec;   INR: 1.04          PTT - ( 24 Jun 2019 11:53 )  PTT:56.9 sec    CARDIAC MARKERS ( 23 Jun 2019 13:05 )  x     / 0.07 ng/mL / 159 U/L / x     / 6.4 ng/mL  CARDIAC MARKERS ( 23 Jun 2019 05:04 )  x     / 0.15 ng/mL / 157 U/L / x     / 7.2 ng/mL  CARDIAC MARKERS ( 22 Jun 2019 22:09 )  0.295 ng/mL / x     / x     / x     / x      CARDIAC MARKERS ( 22 Jun 2019 18:28 )  0.020 ng/mL / x     / x     / x     / x              RADIOLOGY & ADDITIONAL STUDIES:  CAROTID U/S: Pending     CXR: < from: Xray Chest 2 Views PA/Lat (06.22.19 @ 18:53) >    Portable examination  of the chest demonstrates the heart to be within   normal limits in transverse diameter.No acute infiltrates. Visualized   osseous structures are within normal limits. Right nipple piercing noted.   Visualized osseous structures are within normal limits. Mild elevation   left hemidiaphragm.    Impression: No acute infiltrates    < end of copied text >      CT Scan: < from: CT Angio Chest PE Protocol w/ IV Cont (06.22.19 @ 19:35) >  IMPRESSION:   No pulmonary embolism. No acute thoracic pathology.         < end of copied text >    EKG:     TTE Ordered    Cardiac Cath: Surgeon: CLOVIS Rushing    Requesting Physician: Markus Peng    HISTORY OF PRESENT ILLNESS (Need 4):   Patient is a 59 y/o physically active male (bikes, runs, works out), former drug use (cocaine/ecstasy/ETOH), sober x 25 years, with FHx of CAD (father MI in his 60's, brother CAD with PCI in his 50's) and PMHx of RBBB, HTN, prediabetes and ADHD, previously on Truvada as PrEP (recently stopped) who presented to [facility] ED on 6/22/19 c/o midsternal, non-radiating, chest pressure, 7/10 in severity with associated diaphoresis, nausea, lightheaded and fatigue that began at 5:00pm.  It happened after he was walking, then stopped and sat down to get a pedicure.  The symptoms got worse so his friend walked him to the [facility] ED down the street.   He had a similar episode of chest pressure 4 days ago, but it was self-limiting and only lasted a few minutes.  His most recent stress test was ~2 years ago as part of pre-op testing for shoulder surgery and was reportedly normal.  He denies any recent fevers, chills, cough, SOB, n/v/d, abdominal pain, LE edema, or any other symptoms at this time. On admission, labs significant for Trop I 0.02->0.295, D-Dimer 419, EKG SB @ 57 BPM w/ RBBB and TWI in III.  CTA Chest negative for PE.  Pt received ASA 325mg PO x 1, Pepcid 20mg IV x 1, Maalox 30mL PO x 1, and started on Heparin gtt prior to transfer to [facility].  Pt was admitted to [facility] for further management of ACS, and cardiac cath was done today revealing severe CAD (see report below).  Dr. Rushing is being consulted for surgical evaluation.   Currently, patient active CP, SOB, dizziness.  He reports feeling a little nervous/upset about needing surgery, and is concerned about his financial situation since he works independently and is worried about missing work.        PAST MEDICAL & SURGICAL HISTORY:  Prediabetes  ADHD (attention deficit hyperactivity disorder)  HTN (hypertension)  H/O rotator cuff surgery  knee surgery (torn meniscus- sports related)  Carpal tunnel (can't remember which side)      MEDICATIONS  (STANDING):  aspirin enteric coated 81 milliGRAM(s) Oral daily  atorvastatin 80 milliGRAM(s) Oral at bedtime  ticagrelor 90 milliGRAM(s) Oral every 12 hours    MEDICATIONS  (PRN):  heparin  Injectable 4000 Unit(s) IV Push every 6 hours PRN For aPTT less than 40      Allergies    No Known Allergies    Intolerances        SOCIAL HISTORY:  Smoker:  YES Former, quit 25 years ago  ETOH use:  YES quit 25 years ago  Ilicit Drug use:  YES ecstacy and cocaine, quit 25 years ago  Occupation: .    Assisted device use (Cane / Walker): No  Live with: Alone  Very active, exercises, rides his bike (did a 25 mile bike ride 3 days ago).      FAMILY HISTORY:  FH: CAD (coronary artery disease)      Review of Systems (Need 10):  CONSTITUTIONAL: +Fatigue, lightheadedness.  Denies fevers / chills, sweats, weight loss, weight gain                                       NEURO:  Denies parathesias, seizures, syncope, confusion                                                                                  EYES:  Denies blurry vision, discharge, pain, loss of vision                                                                                    ENMT:  Denies difficulty hearing, vertigo, dysphagia, epistaxis, recent dental work                                       CV:  +Chest pain; Denies palpitations, EDEN, orthopnea                                                                                           RESPIRATORY:  Denies Wheezing, SOB, cough / sputum, hemoptysis                                                               GI:  +Nausea.  Denies vomiting, diarrhea, constipation, melena                                                                          : Denies hematuria, dysuria, urgency, incontinence                                                                                          MUSKULOSKELETAL:  Denies arthritis, joint swelling, muscle weakness                                                             SKIN/BREAST:  Denies rash, itching, hair loss, masses                                                                                              PSYCH:  Denies depression, anxiety, suicidal ideation                                                                                                HEME/LYMPH:  Denies bruises easily, enlarged lymph nodes, tender lymph nodes                                          ENDOCRINE:  Denies cold intolerance, heat intolerance, polydipsia                                                                      Vital Signs Last 24 Hrs  T(C): 37 (24 Jun 2019 13:56), Max: 37 (24 Jun 2019 13:56)  T(F): 98.6 (24 Jun 2019 13:56), Max: 98.6 (24 Jun 2019 13:56)  HR: 55 (24 Jun 2019 05:44) (52 - 64)  BP: 112/67 (24 Jun 2019 05:44) (104/62 - 123/78)  BP(mean): --  RR: 17 (24 Jun 2019 05:44) (16 - 17)  SpO2: 96% (24 Jun 2019 05:44) (96% - 99%)    Physical Exam (Need 8)  GEN: NAD, looks comfortable.  Seems anxious about having surgery, but cooperative with H&P.  Breathing on room air.    Psych: Mood appropriate  Neuro: A&Ox3.  No focal deficits.  Moving all extremities.   HEENT: No obvious abnormalities  CV: S1S2, regular, no murmurs appreciated.  No carotid bruits.  No JVD  Lungs: Clear B/L.  No wheezing, rales or rhonchi  ABD: Soft, non-tender, non-distended.  +Bowel sounds  EXT: Warm and well perfused.  No peripheral edema noted  Musculoskeletal: Moving all extremities with normal ROM, no joint swelling  PV: Pedal pulses palpable +DP B/L.  Left radial 2+.  Right radial band in place.      LABS:                        14.6   7.16  )-----------( 241      ( 24 Jun 2019 04:58 )             44.3     06-24    140  |  106  |  15  ----------------------------<  117<H>  4.0   |  24  |  0.91    Ca    9.4      24 Jun 2019 04:58  Mg     2.2     06-23    TPro  6.7  /  Alb  4.1  /  TBili  0.5  /  DBili  x   /  AST  17  /  ALT  15  /  AlkPhos  70  06-24    PT/INR - ( 24 Jun 2019 04:58 )   PT: 11.8 sec;   INR: 1.04          PTT - ( 24 Jun 2019 11:53 )  PTT:56.9 sec    CARDIAC MARKERS ( 23 Jun 2019 13:05 )  x     / 0.07 ng/mL / 159 U/L / x     / 6.4 ng/mL  CARDIAC MARKERS ( 23 Jun 2019 05:04 )  x     / 0.15 ng/mL / 157 U/L / x     / 7.2 ng/mL  CARDIAC MARKERS ( 22 Jun 2019 22:09 )  0.295 ng/mL / x     / x     / x     / x      CARDIAC MARKERS ( 22 Jun 2019 18:28 )  0.020 ng/mL / x     / x     / x     / x              RADIOLOGY & ADDITIONAL STUDIES:  CAROTID U/S: Pending     CXR: < from: Xray Chest 2 Views PA/Lat (06.22.19 @ 18:53) >    Portable examination  of the chest demonstrates the heart to be within   normal limits in transverse diameter.No acute infiltrates. Visualized   osseous structures are within normal limits. Right nipple piercing noted.   Visualized osseous structures are within normal limits. Mild elevation   left hemidiaphragm.    Impression: No acute infiltrates    < end of copied text >      CT Scan: < from: CT Angio Chest PE Protocol w/ IV Cont (06.22.19 @ 19:35) >  IMPRESSION:   No pulmonary embolism. No acute thoracic pathology.         < end of copied text >    EKG: RBBB 57.  No ST elevations seen.     TTE Ordered    Cardiac Cath:  LMCA normal, mLAD 80-90%, D1 70% (large vessel), Ramus 70-80% (large vessel), mLCx 80%, dRCA 90% (large dominant) Surgeon: CLOVIS Rushing    Requesting Physician: Markus Peng    HISTORY OF PRESENT ILLNESS (Need 4):   Patient is a 59 y/o physically active male (bikes, runs, works out), former drug use (cocaine/ecstasy/ETOH), sober x 25 years, with FHx of CAD (father MI in his 60's, brother CAD with PCI in his 50's) and PMHx of RBBB, HTN, prediabetes and ADHD, previously on Truvada as PrEP (recently stopped) who presented to [facility] ED on 6/22/19 c/o midsternal, non-radiating, chest pressure, 7/10 in severity with associated diaphoresis, nausea, lightheaded and fatigue that began at 5:00pm.  It happened after he was walking, then stopped and sat down to get a pedicure.  The symptoms got worse so his friend walked him to the [facility] ED down the street.   He had a similar episode of chest pressure 4 days ago, but it was self-limiting and only lasted a few minutes.  His most recent stress test was ~2 years ago as part of pre-op testing for shoulder surgery and was reportedly normal.  He denies any recent fevers, chills, cough, SOB, n/v/d, abdominal pain, LE edema, or any other symptoms at this time. On admission, labs significant for Trop I 0.02->0.295, D-Dimer 419, EKG SB @ 57 BPM w/ RBBB and TWI in III.  CTA Chest negative for PE.  Pt received ASA 325mg PO x 1, Pepcid 20mg IV x 1, Maalox 30mL PO x 1, and started on Heparin gtt prior to transfer to [facility].  Pt was admitted to [facility] for further management of ACS, and cardiac cath was done today revealing severe CAD (see report below).  Dr. Rushing is being consulted for surgical evaluation.   Currently, patient active CP, SOB, dizziness.  He reports feeling a little nervous/upset about needing surgery, and is concerned about his financial situation since he works independently and is worried about missing work.        PAST MEDICAL & SURGICAL HISTORY:  Prediabetes  ADHD (attention deficit hyperactivity disorder)  HTN (hypertension)  H/O rotator cuff surgery  knee surgery (torn meniscus- sports related)  Carpal tunnel (can't remember which side)      MEDICATIONS  (STANDING):  aspirin enteric coated 81 milliGRAM(s) Oral daily  atorvastatin 80 milliGRAM(s) Oral at bedtime  ticagrelor 90 milliGRAM(s) Oral every 12 hours    MEDICATIONS  (PRN):  heparin  Injectable 4000 Unit(s) IV Push every 6 hours PRN For aPTT less than 40      Allergies    No Known Allergies    Intolerances        SOCIAL HISTORY:  Smoker:  Denies  ETOH use:  YES quit 25 years ago  Ilicit Drug use:  YES ecstacy and cocaine, quit 25 years ago  Occupation: .    Assisted device use (Cane / Walker): No  Live with: Alone  Very active, exercises, rides his bike (did a 25 mile bike ride 3 days ago).      FAMILY HISTORY:  FH: CAD (coronary artery disease)      Review of Systems (Need 10):  CONSTITUTIONAL: +Fatigue, lightheadedness.  Denies fevers / chills, sweats, weight loss, weight gain                                       NEURO:  Denies parathesias, seizures, syncope, confusion                                                                                  EYES:  Denies blurry vision, discharge, pain, loss of vision                                                                                    ENMT:  Denies difficulty hearing, vertigo, dysphagia, epistaxis, recent dental work                                       CV:  +Chest pain; Denies palpitations, EDEN, orthopnea                                                                                           RESPIRATORY:  Denies Wheezing, SOB, cough / sputum, hemoptysis                                                               GI:  +Nausea.  Denies vomiting, diarrhea, constipation, melena                                                                          : Denies hematuria, dysuria, urgency, incontinence                                                                                          MUSKULOSKELETAL:  Denies arthritis, joint swelling, muscle weakness                                                             SKIN/BREAST:  Denies rash, itching, hair loss, masses                                                                                              PSYCH:  Denies depression, anxiety, suicidal ideation                                                                                                HEME/LYMPH:  Denies bruises easily, enlarged lymph nodes, tender lymph nodes                                          ENDOCRINE:  Denies cold intolerance, heat intolerance, polydipsia                                                                      Vital Signs Last 24 Hrs  T(C): 37 (24 Jun 2019 13:56), Max: 37 (24 Jun 2019 13:56)  T(F): 98.6 (24 Jun 2019 13:56), Max: 98.6 (24 Jun 2019 13:56)  HR: 55 (24 Jun 2019 05:44) (52 - 64)  BP: 112/67 (24 Jun 2019 05:44) (104/62 - 123/78)  BP(mean): --  RR: 17 (24 Jun 2019 05:44) (16 - 17)  SpO2: 96% (24 Jun 2019 05:44) (96% - 99%)    Physical Exam (Need 8)  GEN: NAD, looks comfortable.  Seems anxious about having surgery, but cooperative with H&P.  Breathing on room air.    Psych: Mood appropriate  Neuro: A&Ox3.  No focal deficits.  Moving all extremities.   HEENT: No obvious abnormalities  CV: S1S2, regular, no murmurs appreciated.  No carotid bruits.  No JVD  Lungs: Clear B/L.  No wheezing, rales or rhonchi  ABD: Soft, non-tender, non-distended.  +Bowel sounds  EXT: Warm and well perfused.  No peripheral edema noted  Musculoskeletal: Moving all extremities with normal ROM, no joint swelling  PV: Pedal pulses palpable +DP B/L.  Left radial 2+.  Right radial band in place.      LABS:                        14.6   7.16  )-----------( 241      ( 24 Jun 2019 04:58 )             44.3     06-24    140  |  106  |  15  ----------------------------<  117<H>  4.0   |  24  |  0.91    Ca    9.4      24 Jun 2019 04:58  Mg     2.2     06-23    TPro  6.7  /  Alb  4.1  /  TBili  0.5  /  DBili  x   /  AST  17  /  ALT  15  /  AlkPhos  70  06-24    PT/INR - ( 24 Jun 2019 04:58 )   PT: 11.8 sec;   INR: 1.04          PTT - ( 24 Jun 2019 11:53 )  PTT:56.9 sec    CARDIAC MARKERS ( 23 Jun 2019 13:05 )  x     / 0.07 ng/mL / 159 U/L / x     / 6.4 ng/mL  CARDIAC MARKERS ( 23 Jun 2019 05:04 )  x     / 0.15 ng/mL / 157 U/L / x     / 7.2 ng/mL  CARDIAC MARKERS ( 22 Jun 2019 22:09 )  0.295 ng/mL / x     / x     / x     / x      CARDIAC MARKERS ( 22 Jun 2019 18:28 )  0.020 ng/mL / x     / x     / x     / x              RADIOLOGY & ADDITIONAL STUDIES:  CAROTID U/S: Pending     CXR: < from: Xray Chest 2 Views PA/Lat (06.22.19 @ 18:53) >    Portable examination  of the chest demonstrates the heart to be within   normal limits in transverse diameter.No acute infiltrates. Visualized   osseous structures are within normal limits. Right nipple piercing noted.   Visualized osseous structures are within normal limits. Mild elevation   left hemidiaphragm.    Impression: No acute infiltrates    < end of copied text >      CT Scan: < from: CT Angio Chest PE Protocol w/ IV Cont (06.22.19 @ 19:35) >  IMPRESSION:   No pulmonary embolism. No acute thoracic pathology.         < end of copied text >    EKG: RBBB 57.  No ST elevations seen.     TTE Ordered    Cardiac Cath:  LMCA normal, mLAD 80-90%, D1 70% (large vessel), Ramus 70-80% (large vessel), mLCx 80%, dRCA 90% (large dominant)

## 2019-06-24 NOTE — PROGRESS NOTE ADULT - PROBLEM SELECTOR PLAN 1
r/i NSTEMI, Trop I 0.02>0.295>Trop T 0.15, EKG SB @ 57 BPM w/ RBBB and TWI in III, s/p Brilinta and Aspirin load, currently chest pain free and hemodynamically stable  [ ] F/u Trop at 12, Utox  [ ] NPO, consented, and loaded for Cath in AM, please add to schedule  [ ] F/u Echocardiogram  - Continue Heparin gtt  - Holding initiation of BB at this time given baseline HR 50's (very active, biker), Atorvastatin 80mg QHS initiated    #Elevated D-Dimer 419 on admission  - CTA Chest negative for PE Currently CP free  - r/i NSTEMI, Trop I 0.02>0.295>Trop T 0.15 > 0.07  - Utox negative  - EKG SB @ 57 BPM w/ RBBB and TWI in III  - s/p cardiac cath on 6/24/19 with Dr. Peng: CATH  6/24:  3vCAD - LMCA normal, mLAD 80-90%, D1 70% (large vessel), Ramus 70-80% (large vessel), mLCx 80%, dRCA 90% (large dominant)  - Dr. Boland/Dr. Rushing consulted for CTS eval given 3VCAD with possible plan for Hybrid vs. midCAB, vs. CABG  - ECHO ordered, f/u results  - s/p Brilinta and Aspirin load, c/w Ecotrin 81 mg PO QD, f/u with CTS re: discontinuing Brilinta if plan for CTS intervention  - Holding initiation of BB at this time given baseline HR 50's (very active, biker)   - c/w Atorvastatin 80mg QHS  - No ACEi/ARB given upcoming CTS procedure Currently CP free  - r/i NSTEMI, Trop I 0.02>0.295>Trop T 0.15 > 0.07  - Utox negative  - EKG SB @ 57 BPM w/ RBBB and TWI in III  - s/p cardiac cath on 6/24/19 with Dr. Peng: CATH  6/24:  3vCAD - LMCA normal, mLAD 80-90%, D1 70% (large vessel), Ramus 70-80% (large vessel), mLCx 80%, dRCA 90% (large dominant)  - Dr. Rushing consulted for CTS eval given 3VCAD with possible plan for Hybrid vs. midCAB, vs. CABG this admission  - ECHO ordered, f/u results  - s/p Brilinta and Aspirin load, c/w Ecotrin 81 mg PO QD, f/u with CTS re: discontinuing Brilinta if plan for CTS intervention  - Holding initiation of BB at this time given baseline HR 50's (very active, biker)   - c/w Atorvastatin 80mg QHS  - No ACEi/ARB given upcoming CTS procedure  - Discuss with 9L about whether plan to restart heparin gtt for AC

## 2019-06-24 NOTE — PROGRESS NOTE ADULT - PROBLEM SELECTOR PLAN 6
F: Oral intake, NPO after midnight  E: Replete electrolytes as needed for K<4 and Mg<2  N: DASH Diet, NPO after midnight    DVT PPX: on Heparin gtt, Ambulatory  Dispo: Pending clinical progression post-Cath likely Tuesday, no needs    Case discussed with Dr. Mejias F: Oral intake  E: Replete electrolytes as needed for K<4 and Mg<2  N: DASH Diet    DVT PPX: discuss with CTS whether to restart heparin gtt for AC given 3vCAD  GI PPX: Protonix qAM   Dispo: Plan for CTS intervention this admission    Case discussed with Dr. Mejias

## 2019-06-24 NOTE — PROGRESS NOTE ADULT - PROBLEM SELECTOR PLAN 5
F: Oral intake, NPO after midnight  E: Replete electrolytes as needed for K<4 and Mg<2  N: DASH Diet, NPO after midnight    DVT PPX: on Heparin gtt, Ambulatory  Dispo: Pending clinical progression post-Cath likely Tuesday, no needs    Case discussed with Dr. Mejias - Holding home Adderall 20mg QD

## 2019-06-24 NOTE — PROGRESS NOTE ADULT - ASSESSMENT
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 Boland/Kristan consulted, plan for hybrid vs. midCAB vs. CABG this admission, pt to be transferred to 9L     EKG SB @ 57 BPM w/ RBBB and TWI in III. 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 Rushing consulted, plan for hybrid vs. midCAB vs. CABG this admission, pt to be transferred to

## 2019-06-24 NOTE — CONSULT NOTE ADULT - ASSESSMENT
Assesment:  57 y/o physically active male, former drug use, sober x 25 years, with FHx of CAD and PMHx of RBBB, HTN, prediabetes and ADHD, previously on Truvada as PrEP, who presented to [facility] ED on 6/22/19 c/o acute onset CP and fatigue.  He was ruled out for PE, but ruled in for NSTEMI. Cardiac cath confirms significant CAD, and we are being consulted for surgical evaluation.  Heparin drip has been initiated for ACS.  Currently, chest pain free.       Plan:  Problem 1: CAD  CABG this week with Dr. Rushing, pending pre-op work-up.  Patient needs carotid dopplers, PFTs, TTE and T&S.        Problem 2: HTN: Patient takes ARB at home; We recommend holding any ACEI or ARBs 48 hours prior to OHS to avoid vasoplegia perioperatively.  His BP has been controlled thus far.  If an agent is needed, recommend Hydralazine or Imdur.  Beta blockers contraindicated at this time due to SB in the 50's.        Problem 3: Pre-diabetes: A1C <6%.  Continue diabetic diet and ISS as needed.        Problem 4: Prophylactic measures Heparin drip will also serve as DVT prophylaxis. Start GI prophylaxis if needed.      I have reviewed clinical labs tests and reports, radiology tests and reports, as well as old patient medical records, and discussed with the refering physician. Assesment:  57 y/o physically active male, former drug use, sober x 25 years, with FHx of CAD and PMHx of RBBB, HTN, prediabetes and ADHD, previously on Truvada as PrEP, who presented to [facility] ED on 6/22/19 c/o acute onset CP and fatigue.  He was ruled out for PE, but ruled in for NSTEMI. Cardiac cath confirms significant CAD, and we are being consulted for surgical evaluation.  Heparin drip has been initiated for ACS.  Currently, chest pain free.       Plan:  Problem 1: CAD  CABG this week with Dr. Rushing, pending pre-op work-up.  Patient needs carotid dopplers, PFTs, TTE and T&S.  CXR/EKG done and reviewed.  Added on TSH.  All other pre-op labs done and reviewed.  Okay to continue ASA, but stop Brilinta.  Continue w/ heparin gtt for ACS protocol, trend ptt.        Problem 2: HTN: Patient takes ARB at home; We recommend holding any ACEI or ARBs 48 hours prior to OHS to avoid vasoplegia perioperatively.  His BP has been controlled thus far.  If an agent is needed, recommend Hydralazine or Imdur.  Beta blockers contraindicated at this time due to SB in the 50's.        Problem 3: Pre-diabetes: A1C <6%.  Continue diabetic diet and ISS as needed.        Problem 4: Prophylactic measures Heparin drip will also serve as DVT prophylaxis. Start GI prophylaxis if needed.      I have reviewed clinical labs tests and reports, radiology tests and reports, as well as old patient medical records, and discussed with the refering physician. Assesment:  59 y/o physically active male, former drug use, sober x 25 years, with FHx of CAD and PMHx of RBBB, HTN, prediabetes and ADHD, previously on Truvada as PrEP, who presented to [facility] ED on 6/22/19 c/o acute onset CP and fatigue.  He was ruled out for PE, but ruled in for NSTEMI. Cardiac cath confirms significant CAD, and we are being consulted for surgical evaluation.  Heparin drip has been initiated for ACS.  Currently, chest pain free.       Plan:  Problem 1: CAD  CABG this week with Dr. Rushing (or Dr. Boland), date TBD.  Continue pre-op work-up.  Patient needs carotid dopplers, PFTs, TTE and T&S.  CXR/EKG done and reviewed.  Added on TSH.  All other pre-op labs done and reviewed.  Okay to continue ASA, but stop Brilinta.  Continue w/ heparin gtt for ACS protocol, trend ptt.  Bring him up to 9LA when a bed is available.  Patient verbally agreed to have an HIV screen.       Problem 2: HTN: Patient takes ARB at home; We recommend holding any ACEI or ARBs 48 hours prior to OHS to avoid vasoplegia perioperatively.  His BP has been controlled thus far.  If an agent is needed, recommend Hydralazine or Imdur.  Beta blockers contraindicated at this time due to SB in the 50's.        Problem 3: Pre-diabetes: A1C 5.8%.  Continue diabetic diet and ISS as needed.        Problem 4: Prophylactic measures: Heparin drip will also serve as DVT prophylaxis. Start GI prophylaxis if needed.      I have reviewed clinical labs tests and reports, radiology tests and reports, as well as old patient medical records, and discussed with the referring physician.

## 2019-06-24 NOTE — PROGRESS NOTE ADULT - PROBLEM SELECTOR PLAN 2
normotensive  - Continue home Losartan 25mg PO QD  - Holding initiation of BB at this time given baseline HR 50's (very active, biker) D-Dimer 419 on admission  - CTA Chest negative for PE

## 2019-06-24 NOTE — PROGRESS NOTE ADULT - SUBJECTIVE AND OBJECTIVE BOX
Pt is pain free this  AM      PAST MEDICAL & SURGICAL HISTORY:  Prediabetes  ADHD (attention deficit hyperactivity disorder)  HTN (hypertension)  H/O rotator cuff surgery      MEDICATIONS  (STANDING):  aspirin enteric coated 81 milliGRAM(s) Oral daily  atorvastatin 80 milliGRAM(s) Oral at bedtime  heparin  Infusion. 1300 Unit(s)/Hr (13 mL/Hr) IV Continuous <Continuous>  sodium chloride 0.9%. 500 milliLiter(s) (50 mL/Hr) IV Continuous <Continuous>  ticagrelor 90 milliGRAM(s) Oral every 12 hours    MEDICATIONS  (PRN):  heparin  Injectable 4000 Unit(s) IV Push every 6 hours PRN For aPTT less than 40      ICU Vital Signs Last 24 Hrs  T(C): 35.8 (24 Jun 2019 05:00), Max: 36.8 (23 Jun 2019 10:33)  T(F): 96.4 (24 Jun 2019 05:00), Max: 98.2 (23 Jun 2019 10:33)  HR: 55 (24 Jun 2019 05:44) (52 - 71)  BP: 112/67 (24 Jun 2019 05:44) (104/62 - 139/84)  BP(mean): --  ABP: --  ABP(mean): --  RR: 17 (24 Jun 2019 05:44) (16 - 17)  SpO2: 96% (24 Jun 2019 05:44) (96% - 99%)      Lungs clear  Cv s1 s2     abd soft    ext stable                          14.6   7.16  )-----------( 241      ( 24 Jun 2019 04:58 )             44.3   06-24    140  |  106  |  15  ----------------------------<  117<H>  4.0   |  24  |  0.91    Ca    9.4      24 Jun 2019 04:58  Mg     2.2     06-23    TPro  6.7  /  Alb  4.1  /  TBili  0.5  /  DBili  x   /  AST  17  /  ALT  15  /  AlkPhos  70  06-24  CARDIAC MARKERS ( 23 Jun 2019 13:05 )  x     / 0.07 ng/mL / 159 U/L / x     / 6.4 ng/mL  CARDIAC MARKERS ( 23 Jun 2019 05:04 )  x     / 0.15 ng/mL / 157 U/L / x     / 7.2 ng/mL  CARDIAC MARKERS ( 22 Jun 2019 22:09 )  0.295 ng/mL / x     / x     / x     / x      CARDIAC MARKERS ( 22 Jun 2019 18:28 )  0.020 ng/mL / x     / x     / x     / x        EKG   NSR   RBBB    T inv  111 AVF     ST hi takeoff    V2 V 3 V4

## 2019-06-24 NOTE — PROGRESS NOTE ADULT - SUBJECTIVE AND OBJECTIVE BOX
Interventional Cardiology PA Adult Progress Note    CC: SSCP upon admit    Subjective Assessment:  Pt seen and examined at bedside. Anxious about cardiac cath. Denies CP, SOB, dizziness, palpitations, N/V, abdominal pain. ALl other ROS otherwise negative expect per subjective.   	  MEDICATIONS:  atorvastatin 80 milliGRAM(s) Oral at bedtime  aspirin enteric coated 81 milliGRAM(s) Oral daily  ticagrelor 90 milliGRAM(s) Oral every 12 hours    [PHYSICAL EXAM:  TELEMETRY:  T(C): 37 (06-24-19 @ 13:56), Max: 37 (06-24-19 @ 13:56)  HR: 55 (06-24-19 @ 05:44) (52 - 64)  BP: 112/67 (06-24-19 @ 05:44) (104/62 - 123/78)  RR: 17 (06-24-19 @ 05:44) (16 - 17)  SpO2: 96% (06-24-19 @ 05:44) (96% - 99%)  Wt(kg): --  I&O's Summary    23 Jun 2019 07:01  -  24 Jun 2019 07:00  --------------------------------------------------------  IN: 732 mL / OUT: 1875 mL / NET: -1143 mL    24 Jun 2019 07:01  -  24 Jun 2019 15:46  --------------------------------------------------------  IN: 0 mL / OUT: 0 mL / NET: 0 mL                                         Appearance: WD/WN laying in hospital bed in NAD	  HEENT:   Normal oral mucosa,  EOMI	  Neck: Supple, - JVD  Cardiovascular: Normal S1 S2, No murmurs  Respiratory: CTA B/L, no w/r/r  Gastrointestinal:  Soft, Non-tender, + BS	x4  Skin: No rashes, No ecchymoses, No cyanosis  Extremities: Normal range of motion, no c/c/e  Vascular: DP Faint B/L, PT 1+ B/L, radial 2+ B/L, femoral 1+ without bruit B/L  Neurologic: Non-focal  Psychiatry: A & O x 3, Mood & affect appropriate                        14.6   7.16  )-----------( 241      ( 24 Jun 2019 04:58 )             44.3     06-24    140  |  106  |  15  ----------------------------<  117<H>  4.0   |  24  |  0.91    Ca    9.4      24 Jun 2019 04:58  Mg     2.2     06-23    TPro  6.7  /  Alb  4.1  /  TBili  0.5  /  DBili  x   /  AST  17  /  ALT  15  /  AlkPhos  70  06-24  PT/INR - ( 24 Jun 2019 04:58 )   PT: 11.8 sec;   INR: 1.04     PTT - ( 24 Jun 2019 11:53 )  PTT:56.9 sec

## 2019-06-25 PROBLEM — R73.03 PREDIABETES: Chronic | Status: ACTIVE | Noted: 2019-06-23

## 2019-06-25 PROBLEM — I10 ESSENTIAL (PRIMARY) HYPERTENSION: Chronic | Status: ACTIVE | Noted: 2019-06-22

## 2019-06-25 PROBLEM — Z00.00 ENCOUNTER FOR PREVENTIVE HEALTH EXAMINATION: Status: ACTIVE | Noted: 2019-06-25

## 2019-06-25 PROBLEM — F90.9 ATTENTION-DEFICIT HYPERACTIVITY DISORDER, UNSPECIFIED TYPE: Chronic | Status: ACTIVE | Noted: 2019-06-22

## 2019-06-25 LAB
ANION GAP SERPL CALC-SCNC: 13 MMOL/L — SIGNIFICANT CHANGE UP (ref 5–17)
APTT BLD: 62.8 SEC — HIGH (ref 27.5–36.3)
APTT BLD: 75.1 SEC — HIGH (ref 27.5–36.3)
APTT BLD: 80.9 SEC — HIGH (ref 27.5–36.3)
BLD GP AB SCN SERPL QL: NEGATIVE — SIGNIFICANT CHANGE UP
BUN SERPL-MCNC: 19 MG/DL — SIGNIFICANT CHANGE UP (ref 7–23)
CALCIUM SERPL-MCNC: 9.3 MG/DL — SIGNIFICANT CHANGE UP (ref 8.4–10.5)
CHLORIDE SERPL-SCNC: 107 MMOL/L — SIGNIFICANT CHANGE UP (ref 96–108)
CO2 SERPL-SCNC: 22 MMOL/L — SIGNIFICANT CHANGE UP (ref 22–31)
CREAT SERPL-MCNC: 0.84 MG/DL — SIGNIFICANT CHANGE UP (ref 0.5–1.3)
GLUCOSE SERPL-MCNC: 131 MG/DL — HIGH (ref 70–99)
HCT VFR BLD CALC: 43.4 % — SIGNIFICANT CHANGE UP (ref 39–50)
HGB BLD-MCNC: 14.6 G/DL — SIGNIFICANT CHANGE UP (ref 13–17)
HIV 1+2 AB+HIV1 P24 AG SERPL QL IA: SIGNIFICANT CHANGE UP
INR BLD: 1 — SIGNIFICANT CHANGE UP (ref 0.88–1.16)
MAGNESIUM SERPL-MCNC: 1.9 MG/DL — SIGNIFICANT CHANGE UP (ref 1.6–2.6)
MCHC RBC-ENTMCNC: 30.5 PG — SIGNIFICANT CHANGE UP (ref 27–34)
MCHC RBC-ENTMCNC: 33.6 GM/DL — SIGNIFICANT CHANGE UP (ref 32–36)
MCV RBC AUTO: 90.8 FL — SIGNIFICANT CHANGE UP (ref 80–100)
NRBC # BLD: 0 /100 WBCS — SIGNIFICANT CHANGE UP (ref 0–0)
PLATELET # BLD AUTO: 256 K/UL — SIGNIFICANT CHANGE UP (ref 150–400)
POTASSIUM SERPL-MCNC: 4.4 MMOL/L — SIGNIFICANT CHANGE UP (ref 3.5–5.3)
POTASSIUM SERPL-SCNC: 4.4 MMOL/L — SIGNIFICANT CHANGE UP (ref 3.5–5.3)
PROTHROM AB SERPL-ACNC: 11.3 SEC — SIGNIFICANT CHANGE UP (ref 10–12.9)
RBC # BLD: 4.78 M/UL — SIGNIFICANT CHANGE UP (ref 4.2–5.8)
RBC # FLD: 12.8 % — SIGNIFICANT CHANGE UP (ref 10.3–14.5)
RH IG SCN BLD-IMP: POSITIVE — SIGNIFICANT CHANGE UP
SODIUM SERPL-SCNC: 142 MMOL/L — SIGNIFICANT CHANGE UP (ref 135–145)
WBC # BLD: 7.71 K/UL — SIGNIFICANT CHANGE UP (ref 3.8–10.5)
WBC # FLD AUTO: 7.71 K/UL — SIGNIFICANT CHANGE UP (ref 3.8–10.5)

## 2019-06-25 PROCEDURE — 93306 TTE W/DOPPLER COMPLETE: CPT | Mod: 26

## 2019-06-25 PROCEDURE — 93010 ELECTROCARDIOGRAM REPORT: CPT

## 2019-06-25 PROCEDURE — 94010 BREATHING CAPACITY TEST: CPT | Mod: 26

## 2019-06-25 PROCEDURE — 99233 SBSQ HOSP IP/OBS HIGH 50: CPT

## 2019-06-25 RX ORDER — SENNA PLUS 8.6 MG/1
2 TABLET ORAL AT BEDTIME
Refills: 0 | Status: DISCONTINUED | OUTPATIENT
Start: 2019-06-25 | End: 2019-06-26

## 2019-06-25 RX ORDER — VORTIOXETINE 5 MG/1
10 TABLET, FILM COATED ORAL DAILY
Refills: 0 | Status: DISCONTINUED | OUTPATIENT
Start: 2019-06-25 | End: 2019-07-01

## 2019-06-25 RX ORDER — HEPARIN SODIUM 5000 [USP'U]/ML
1250 INJECTION INTRAVENOUS; SUBCUTANEOUS
Qty: 25000 | Refills: 0 | Status: DISCONTINUED | OUTPATIENT
Start: 2019-06-25 | End: 2019-06-26

## 2019-06-25 RX ORDER — DOCUSATE SODIUM 100 MG
100 CAPSULE ORAL THREE TIMES A DAY
Refills: 0 | Status: DISCONTINUED | OUTPATIENT
Start: 2019-06-25 | End: 2019-06-26

## 2019-06-25 RX ORDER — MAGNESIUM OXIDE 400 MG ORAL TABLET 241.3 MG
400 TABLET ORAL ONCE
Refills: 0 | Status: COMPLETED | OUTPATIENT
Start: 2019-06-25 | End: 2019-06-25

## 2019-06-25 RX ADMIN — Medication 100 MILLIGRAM(S): at 21:16

## 2019-06-25 RX ADMIN — PANTOPRAZOLE SODIUM 40 MILLIGRAM(S): 20 TABLET, DELAYED RELEASE ORAL at 07:57

## 2019-06-25 RX ADMIN — HEPARIN SODIUM 1300 UNIT(S)/HR: 5000 INJECTION INTRAVENOUS; SUBCUTANEOUS at 01:01

## 2019-06-25 RX ADMIN — SENNA PLUS 2 TABLET(S): 8.6 TABLET ORAL at 21:16

## 2019-06-25 RX ADMIN — VORTIOXETINE 10 MILLIGRAM(S): 5 TABLET, FILM COATED ORAL at 12:38

## 2019-06-25 RX ADMIN — HEPARIN SODIUM 1300 UNIT(S)/HR: 5000 INJECTION INTRAVENOUS; SUBCUTANEOUS at 07:03

## 2019-06-25 RX ADMIN — ATORVASTATIN CALCIUM 80 MILLIGRAM(S): 80 TABLET, FILM COATED ORAL at 21:16

## 2019-06-25 RX ADMIN — Medication 100 MILLIGRAM(S): at 17:16

## 2019-06-25 RX ADMIN — Medication 81 MILLIGRAM(S): at 12:37

## 2019-06-25 RX ADMIN — MAGNESIUM OXIDE 400 MG ORAL TABLET 400 MILLIGRAM(S): 241.3 TABLET ORAL at 07:56

## 2019-06-25 RX ADMIN — HEPARIN SODIUM 12.5 UNIT(S)/HR: 5000 INJECTION INTRAVENOUS; SUBCUTANEOUS at 20:10

## 2019-06-25 RX ADMIN — HEPARIN SODIUM 12.5 UNIT(S)/HR: 5000 INJECTION INTRAVENOUS; SUBCUTANEOUS at 13:42

## 2019-06-25 NOTE — PROGRESS NOTE ADULT - ASSESSMENT
58 year old M, former drug use, sober x 25 years, with FHx of CAD and PMHx of RBBB, HTN, prediabetes and ADHD, previously on Truvada as PrEP, who presented to [facility] ED on 6/22/19 c/o acute onset CP and fatigue.  He was ruled out for PE, but ruled in for NSTEMI. Cardiac cath 6/24/19 with Dr. Peng with 3vCAD mLAD 80-90%, D1 70%, Ramus 70-80%, mLCx 80%, dRCA 90%, and we are being consulted for surgical evaluation.  Heparin drip has been initiated for ACS.  Currently, chest pain free. Undergoing PST for CABG Thursday or Friday.    A/P:  Neurovascular: No delirium.  -Hx ADHD- f/u restarting adderall and antidepressant    Cardiovascular: Hemodynamically stable. HR controlled.  -Hx RBBB, HTN, NSTEMI, pre-op for cabg  -Brillinta loaded 5/23  -continue asa 81mg daily, atrovastatin 80mg qhs and hep gtt, PTT in AM 63, f/u noon  -Holding pre op BB for Bradycardia in the 50s, will CTM.   -f/u echo today  -monitor hr/bp/tele    Respiratory: 02 Sat = 98% on RA.  -Encourage ambulation, C+DB and Use of IS 10x / hr while awake.  -CXR- stable  -f/u PFTs    GI: Stable.  -protonix for GI PPX.  -Continue bowel regimen  -PO Diet.    Renal / : BUN/Cr 19/0.84  -Monitor renal function.  -Monitor I/O's.  -Replete lytes PRN    Endocrine:    -A1c 5.8  -TSH WNL    Hematologic: H&H Stable  -f/u AM CBC    ID: Afebrile, WBC 7  -Hx Drug abuse, quit 25 years ago, was on Truvada for prep, f/u HIV screen  -UTOX negative   -Observe for SIRS/Sepsis Syndrome.    Prophylaxis:  -hep gtt  -SCD's    Disposition:  -Transfer to [facility] under Dr. Rushing 58 year old M, former drug use, sober x 25 years, with FHx of CAD and PMHx of RBBB, HTN, prediabetes and ADHD, previously on Truvada as PrEP, who presented to [facility] ED on 6/22/19 c/o acute onset CP and fatigue.  He was ruled out for PE, but ruled in for NSTEMI. Cardiac cath 6/24/19 with Dr. Peng with 3vCAD mLAD 80-90%, D1 70%, Ramus 70-80%, mLCx 80%, dRCA 90%, and we are being consulted for surgical evaluation.  Heparin drip has been initiated for ACS.  Currently, chest pain free. Undergoing PST for CABG Thursday or Friday.    A/P:  Neurovascular: No delirium.  -Hx ADHD- f/u restarting adderall and antidepressant    Cardiovascular: Hemodynamically stable. HR controlled.  -Hx RBBB, HTN, NSTEMI, pre-op for cabg  -Brillinta loaded 5/23  -continue asa 81mg daily, atrovastatin 80mg qhs and hep gtt, PTT in AM 63, f/u noon  -Holding pre op BB for Bradycardia in the 50s, will CTM.   -Holding ACE 2/2 periop hemodynamic instability  -if HTN, will use Imdur and/or hydralazine  -f/u echo today  -monitor hr/bp/tele    Respiratory: 02 Sat = 98% on RA.  -Encourage ambulation, C+DB and Use of IS 10x / hr while awake.  -CXR- stable  -f/u PFTs    GI: Stable.  -protonix for GI PPX.  -Continue bowel regimen  -PO Diet.    Renal / : BUN/Cr 19/0.84  -Monitor renal function.  -Monitor I/O's.  -Replete lytes PRN    Endocrine:    -A1c 5.8  -TSH WNL    Hematologic: H&H Stable  -f/u AM CBC    ID: Afebrile, WBC 7  -Hx Drug abuse, quit 25 years ago, was on Truvada for prep, f/u HIV screen  -UTOX negative   -Observe for SIRS/Sepsis Syndrome.    Prophylaxis:  -hep gtt  -SCD's    Disposition:  -Transfer to [facility] under Dr. Rushing

## 2019-06-25 NOTE — PROGRESS NOTE ADULT - ATTENDING COMMENTS
See PA note written above, for details. I reviewed the PA documentation.  I reviewed vitals, labs, medications, cardiac studies and additional imaging 6/25.  I agree with the PA's findings and plans as written above with the following additions/amendments:    58M former cocaine use with Essential HTN, Pre-DM, and ADHD, who presented from [facility] with NSTEMI. Patient underwent [facility] notable for 3vCAD, Patient now undergoing pre operative work up for CABG planned for 6/27 vs 6/28 and will be transferred to [facility] today.  Physical Exam notable for: late middle aged male sitting up in bed in NAD, FLAT neck veins, RRR, no MGR detected, clear lungs, soft abdomen, NTTP, no fluid wave detected, +BS, right radial access site stable with no bruising or hematoma, no pretibial pitting edema, no ankle edema, skin WWP throughout, A&Ox3  Plan for:  Heparin gtt while awaiting revascularization  ASA 81mg po daily, Atorva 80  Holding BB due to bradycardia in 50s  No ACE perioperatively  High intensity statin Atorva 40/80  Awaiting TTE for structural assessment  Awaiting completion of PFTs  Patient to transfer to [facility] under Dr. Rushing [facility] today  PASTORA Bejraano.  Cardiology Attending

## 2019-06-25 NOTE — PROGRESS NOTE ADULT - PROBLEM SELECTOR PLAN 6
F: Oral intake  E: Replete electrolytes as needed for K<4 and Mg<2  N: DASH Diet    DVT PPX: heparin gtt for AC  GI PPX: Protonix qAM   Dispo: Plan for CTS intervention this admission    Case discussed with Dr. Mejias

## 2019-06-25 NOTE — PROGRESS NOTE ADULT - ASSESSMENT
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 Rushing consulted, plan for CABG this admission and for pt to be transferred to  today.

## 2019-06-25 NOTE — CHART NOTE - NSCHARTNOTEFT_GEN_A_CORE
Pt seen at the bedside.  Pulsitility index checked on left hand to asses left radial artery usage as possible bypass conduit.    With Left radial artery compression:  Left thumb PI went from 0.6 to .042  Left index finger PI went from 0.75 to 0.68

## 2019-06-25 NOTE — PROGRESS NOTE ADULT - SUBJECTIVE AND OBJECTIVE BOX
Interventional Cardiology PA Adult Progress Note    CC: SSCP upon admit    Subjective Assessment:  Pt seen and evaluated at bedside.  Wants to take shower. Denies CP, SOB, dizziness, palpitations, N/V, abdominal pain. ALl other ROS otherwise negative except per subjective.  	  MEDICATIONS:  docusate sodium 100 milliGRAM(s) Oral three times a day  pantoprazole    Tablet 40 milliGRAM(s) Oral before breakfast  senna 2 Tablet(s) Oral at bedtim  atorvastatin 80 milliGRAM(s) Oral at bedtime  aspirin enteric coated 81 milliGRAM(s) Oral daily  heparin  Infusion. 1300 Unit(s)/Hr IV Continuous <Continuous>      [PHYSICAL EXAM:  TELEMETRY:  T(C): 36.4 (06-25-19 @ 08:40), Max: 37 (06-24-19 @ 13:56)  HR: 60 (06-25-19 @ 09:21) (54 - 64)  BP: 133/75 (06-25-19 @ 09:21) (119/69 - 156/96)  RR: 16 (06-25-19 @ 09:21) (16 - 16)  SpO2: 96% (06-25-19 @ 09:21) (96% - 100%)  Wt(kg): --  I&O's Summary    24 Jun 2019 07:01  -  25 Jun 2019 07:00  --------------------------------------------------------  IN: 91 mL / OUT: 0 mL / NET: 91 mL    25 Jun 2019 07:01  -  25 Jun 2019 12:20  --------------------------------------------------------  IN: 193 mL / OUT: 0 mL / NET: 193 mL    Appearance: WD/WN laying in hospital bed in nAD	  HEENT:   Normal oral mucosa,, EOMI	  Neck: Supple, - JVD  Cardiovascular: Normal S1 S2, No murmurs,   Respiratory: CTA B/L, no w/r/r	  Gastrointestinal:  Soft, Non-tender, + BS	x4  Skin: No rashes, No ecchymoses, No cyanosis  Extremities: Normal range of motion, no c/c/e  Vascular:DP 1+ B/L, PT faint B/L  Neurologic: Non-focal  Psychiatry: A & O x 3, Mood & affect appropriate                            14.6   7.71  )-----------( 256      ( 25 Jun 2019 06:26 )             43.4     06-25    142  |  107  |  19  ----------------------------<  131<H>  4.4   |  22  |  0.84    Ca    9.3      25 Jun 2019 06:26  Mg     1.9     06-25    TPro  6.7  /  Alb  4.1  /  TBili  0.5  /  DBili  x   /  AST  17  /  ALT  15  /  AlkPhos  70  06-24  PT/INR - ( 25 Jun 2019 06:26 )   PT: 11.3 sec;   INR: 1.00     PTT - ( 25 Jun 2019 06:26 )  PTT:62.8 sec

## 2019-06-25 NOTE — PROGRESS NOTE ADULT - PROBLEM SELECTOR PLAN 3
-150s  - Hold home Losartan 25mg PO QD in light of upcoming CTS procedure  - Holding initiation of BB at this time given baseline HR 50's (very active, biker)  - Can add Imdur vs. Hydralazine if agent needed

## 2019-06-25 NOTE — PROGRESS NOTE ADULT - PROBLEM SELECTOR PLAN 1
Currently CP free  - r/i NSTEMI, Trop I 0.02>0.295>Trop T 0.15 > 0.07  - Utox negative  - EKG SB @ 57 BPM w/ RBBB and TWI in III  - s/p cardiac cath on 6/24/19 with Dr. Peng: CATH  6/24:  3vCAD - LMCA normal, mLAD 80-90%, D1 70% (large vessel), Ramus 70-80% (large vessel), mLCx 80%, dRCA 90% (large dominant)  - Plan for CABG this admission with Dr. Rushing   - ECHO completed, f/u results  - US carotids: No hemodynamically significant stenosis.  - f/u PFTs (ordered)  - c/w Ecotrin 81 mg PO QD  - Holding initiation of BB at this time given baseline HR 50's (very active, biker)   - c/w Atorvastatin 80mg QHS  - No ACEi/ARB given upcoming CTS procedure  - c/w heparin gtt for AC

## 2019-06-25 NOTE — PROGRESS NOTE ADULT - SUBJECTIVE AND OBJECTIVE BOX
Patient discussed on morning rounds with Dr. Rushing    Operation / Date: Pre-op CABG    SUBJECTIVE ASSESSMENT:  58y Male seen and examined. Chest pain free, denies fever, chest pain, palpitations, SOB, abdominal pain, n/v.       Vital Signs Last 24 Hrs  T(C): 36.3 (25 Jun 2019 06:19), Max: 37 (24 Jun 2019 13:56)  T(F): 97.3 (25 Jun 2019 06:19), Max: 98.6 (24 Jun 2019 13:56)  HR: 56 (25 Jun 2019 05:55) (54 - 64)  BP: 132/70 (25 Jun 2019 05:55) (111/66 - 156/96)  BP(mean): --  RR: 16 (25 Jun 2019 05:55) (16 - 16)  SpO2: 100% (25 Jun 2019 05:55) (98% - 100%)  I&O's Detail    24 Jun 2019 07:01  -  25 Jun 2019 07:00  --------------------------------------------------------  IN:    heparin  Infusion.: 91 mL  Total IN: 91 mL    OUT:  Total OUT: 0 mL    Total NET: 91 mL      25 Jun 2019 07:01  -  25 Jun 2019 08:20  --------------------------------------------------------  IN:    heparin  Infusion.: 13 mL  Total IN: 13 mL    OUT:  Total OUT: 0 mL    Total NET: 13 mL    PHYSICAL EXAM:    General: Patient lying comfortably in bed, no acute distress     Neurological: Alert and oriented. No focal neurological deficits     Cardiovascular: S1S2, RRR, no murmurs appreciated on exam     Respiratory: Clear to ausculation bilaterally, no wheeze/rhonchi/rales    Gastrointestinal: + BS, soft, non tender, non distended     Extremities: Warm and well perfused. No edema, no calf tenderness     Vascular: Palpable peripheral pulses b/l     Incision Sites: R radial cath site: CDI>    LABS:                        14.6   7.71  )-----------( 256      ( 25 Jun 2019 06:26 )             43.4       COUMADIN:  No  PT/INR - ( 25 Jun 2019 06:26 )   PT: 11.3 sec;   INR: 1.00          PTT - ( 25 Jun 2019 06:26 )  PTT:62.8 sec    06-25    142  |  107  |  19  ----------------------------<  131<H>  4.4   |  22  |  0.84    Ca    9.3      25 Jun 2019 06:26  Mg     1.9     06-25    TPro  6.7  /  Alb  4.1  /  TBili  0.5  /  DBili  x   /  AST  17  /  ALT  15  /  AlkPhos  70  06-24          MEDICATIONS  (STANDING):  aspirin enteric coated 81 milliGRAM(s) Oral daily  atorvastatin 80 milliGRAM(s) Oral at bedtime  heparin  Infusion. 1300 Unit(s)/Hr (13 mL/Hr) IV Continuous <Continuous>  pantoprazole    Tablet 40 milliGRAM(s) Oral before breakfast  sodium chloride 0.9%. 500 milliLiter(s) (50 mL/Hr) IV Continuous <Continuous>    MEDICATIONS  (PRN):        RADIOLOGY & ADDITIONAL TESTS:

## 2019-06-26 LAB
ANION GAP SERPL CALC-SCNC: 12 MMOL/L — SIGNIFICANT CHANGE UP (ref 5–17)
APPEARANCE UR: CLEAR — SIGNIFICANT CHANGE UP
APTT BLD: 64.3 SEC — HIGH (ref 27.5–36.3)
APTT BLD: 83.4 SEC — HIGH (ref 27.5–36.3)
BILIRUB UR-MCNC: NEGATIVE — SIGNIFICANT CHANGE UP
BUN SERPL-MCNC: 22 MG/DL — SIGNIFICANT CHANGE UP (ref 7–23)
CALCIUM SERPL-MCNC: 9.5 MG/DL — SIGNIFICANT CHANGE UP (ref 8.4–10.5)
CHLORIDE SERPL-SCNC: 106 MMOL/L — SIGNIFICANT CHANGE UP (ref 96–108)
CO2 SERPL-SCNC: 22 MMOL/L — SIGNIFICANT CHANGE UP (ref 22–31)
COLOR SPEC: YELLOW — SIGNIFICANT CHANGE UP
CREAT SERPL-MCNC: 0.95 MG/DL — SIGNIFICANT CHANGE UP (ref 0.5–1.3)
DIFF PNL FLD: NEGATIVE — SIGNIFICANT CHANGE UP
GLUCOSE SERPL-MCNC: 107 MG/DL — HIGH (ref 70–99)
GLUCOSE UR QL: NEGATIVE — SIGNIFICANT CHANGE UP
HCT VFR BLD CALC: 43.2 % — SIGNIFICANT CHANGE UP (ref 39–50)
HGB BLD-MCNC: 14.4 G/DL — SIGNIFICANT CHANGE UP (ref 13–17)
INR BLD: 1.03 — SIGNIFICANT CHANGE UP (ref 0.88–1.16)
KETONES UR-MCNC: NEGATIVE — SIGNIFICANT CHANGE UP
LEUKOCYTE ESTERASE UR-ACNC: NEGATIVE — SIGNIFICANT CHANGE UP
MCHC RBC-ENTMCNC: 29.9 PG — SIGNIFICANT CHANGE UP (ref 27–34)
MCHC RBC-ENTMCNC: 33.3 GM/DL — SIGNIFICANT CHANGE UP (ref 32–36)
MCV RBC AUTO: 89.8 FL — SIGNIFICANT CHANGE UP (ref 80–100)
NITRITE UR-MCNC: NEGATIVE — SIGNIFICANT CHANGE UP
NRBC # BLD: 0 /100 WBCS — SIGNIFICANT CHANGE UP (ref 0–0)
PH UR: 6 — SIGNIFICANT CHANGE UP (ref 5–8)
PLATELET # BLD AUTO: 238 K/UL — SIGNIFICANT CHANGE UP (ref 150–400)
POTASSIUM SERPL-MCNC: 4.3 MMOL/L — SIGNIFICANT CHANGE UP (ref 3.5–5.3)
POTASSIUM SERPL-SCNC: 4.3 MMOL/L — SIGNIFICANT CHANGE UP (ref 3.5–5.3)
PROT UR-MCNC: NEGATIVE MG/DL — SIGNIFICANT CHANGE UP
PROTHROM AB SERPL-ACNC: 11.7 SEC — SIGNIFICANT CHANGE UP (ref 10–12.9)
RBC # BLD: 4.81 M/UL — SIGNIFICANT CHANGE UP (ref 4.2–5.8)
RBC # FLD: 12.9 % — SIGNIFICANT CHANGE UP (ref 10.3–14.5)
SODIUM SERPL-SCNC: 140 MMOL/L — SIGNIFICANT CHANGE UP (ref 135–145)
SP GR SPEC: 1.02 — SIGNIFICANT CHANGE UP (ref 1–1.03)
UROBILINOGEN FLD QL: 0.2 E.U./DL — SIGNIFICANT CHANGE UP
WBC # BLD: 7.48 K/UL — SIGNIFICANT CHANGE UP (ref 3.8–10.5)
WBC # FLD AUTO: 7.48 K/UL — SIGNIFICANT CHANGE UP (ref 3.8–10.5)

## 2019-06-26 RX ORDER — HEPARIN SODIUM 5000 [USP'U]/ML
1200 INJECTION INTRAVENOUS; SUBCUTANEOUS
Qty: 25000 | Refills: 0 | Status: DISCONTINUED | OUTPATIENT
Start: 2019-06-26 | End: 2019-06-27

## 2019-06-26 RX ORDER — CHLORHEXIDINE GLUCONATE 213 G/1000ML
10 SOLUTION TOPICAL ONCE
Refills: 0 | Status: COMPLETED | OUTPATIENT
Start: 2019-06-26 | End: 2019-06-27

## 2019-06-26 RX ORDER — CHLORHEXIDINE GLUCONATE 213 G/1000ML
1 SOLUTION TOPICAL ONCE
Refills: 0 | Status: COMPLETED | OUTPATIENT
Start: 2019-06-27 | End: 2019-06-27

## 2019-06-26 RX ORDER — CHLORHEXIDINE GLUCONATE 213 G/1000ML
1 SOLUTION TOPICAL ONCE
Refills: 0 | Status: COMPLETED | OUTPATIENT
Start: 2019-06-26 | End: 2019-06-26

## 2019-06-26 RX ORDER — SODIUM CHLORIDE 9 MG/ML
1000 INJECTION, SOLUTION INTRAVENOUS
Refills: 0 | Status: DISCONTINUED | OUTPATIENT
Start: 2019-06-26 | End: 2019-06-27

## 2019-06-26 RX ADMIN — HEPARIN SODIUM 12 UNIT(S)/HR: 5000 INJECTION INTRAVENOUS; SUBCUTANEOUS at 14:17

## 2019-06-26 RX ADMIN — CHLORHEXIDINE GLUCONATE 1 APPLICATION(S): 213 SOLUTION TOPICAL at 21:25

## 2019-06-26 RX ADMIN — PANTOPRAZOLE SODIUM 40 MILLIGRAM(S): 20 TABLET, DELAYED RELEASE ORAL at 06:41

## 2019-06-26 RX ADMIN — VORTIOXETINE 10 MILLIGRAM(S): 5 TABLET, FILM COATED ORAL at 06:41

## 2019-06-26 RX ADMIN — Medication 100 MILLIGRAM(S): at 05:48

## 2019-06-26 RX ADMIN — SODIUM CHLORIDE 75 MILLILITER(S): 9 INJECTION, SOLUTION INTRAVENOUS at 22:01

## 2019-06-26 RX ADMIN — Medication 81 MILLIGRAM(S): at 11:02

## 2019-06-26 RX ADMIN — ATORVASTATIN CALCIUM 80 MILLIGRAM(S): 80 TABLET, FILM COATED ORAL at 21:42

## 2019-06-26 NOTE — DIETITIAN INITIAL EVALUATION ADULT. - ENERGY NEEDS
Ht 182.88cm; Wt 86.2Kg  IBW 80.9Kg; %%  BMI 25.8    Utilized ABW to calculate needs, pt falls within % of IBW. Adjusted for pre-op.

## 2019-06-26 NOTE — DIETITIAN INITIAL EVALUATION ADULT. - PROBLEM SELECTOR PLAN 1
Currently CP free and HD stable  - Trop I 0.02->0.295 at [facility], f/u AM Trop T and 12PM Trop T  -  EKG SB @ 57 BPM w/ RBBB and TWI in III. F/u AM EKG  - Loaded with ASA 325mg PO x 1, started Heparin gtt, given Lipitor 80mg PO x 1 at [facility] ED  - Loaded with Brilinta 180mg PO x 1 on arrival to [facility]  - Continue ASA 81mg QD, Brilinta 90mg BID, Lipitor 80mg QHS, Losartan 25mg QD, and Heparin gtt  - No BB at this time as pt persistently bradycardic. Consider starting in AM  - Echo ordered, f/u results  - Discuss timing of cardiac cath with Dr Mejias in AM    #Elevated D-Dimer 419 on admission  - CTA Chest negative for PE

## 2019-06-26 NOTE — PROGRESS NOTE ADULT - ASSESSMENT
This is a 58 year old male, former drug use, sober x 25 years, with FHx of CAD and PMHx of RBBB, HTN, prediabetes and ADHD, previously on Truvada as PrEP, who presented to [facility] ED on 6/22/19 c/o acute onset CP and fatigue.  He was ruled out for PE, but ruled in for NSTEMI. Cardiac cath 6/24/19 with Dr. Peng with 3vCAD mLAD 80-90%, D1 70%, Ramus 70-80%, mLCx 80%, dRCA 90%, and we were consulted for surgical evaluation.  Heparin drip started for ACS.  Currently, chest pain free. Pre-op work-up complete.  Ptt this morning 83, decreased from 12.5U/hour to 12 U/hour.      A/P:  Neurovascular: Hx ADHD- Resumed home anti-depressant.  Holding Adderall for now.      Cardiovascular: SB in the low 50's, asymptomatic.  BP stable.   -Hx RBBB, HTN, NSTEMI, pre-op for OPCAB tomorrow with Dr. Boland.  Continue statin and ASA.    -Brillinta loaded 6/23/19, stopped on 6/24/19.    -Holding pre-op beta blocker for bradycardia in the 50s  -Avoiding ACEI, to help prevent perioperative vasoplegia.   -Echo normal, no valve disease, EF preserved.     Respiratory: Breathing on room air, CXR stable. Good O2 saturation.     GI: PO diet.  No acute issues.     Renal / : BUN/creatinine normal.  No issues. Voiding on his own.     Endocrine: "Pre-diabetic".  Not on home meds.  A1C 5.8%    Hematologic: H/H stable, no issues.  On Heparin gtt for ACS.  Ptt 83 this am.  Decreased drip by 0.5u/hour.  Check ptt at 2pm.     ID:   -Previously on Truvada Prep, HIV screen negative   -UTOX negative/UA negative.      Prophylaxis:  -hep gtt  -SCD's  -Protonix    Disposition:  -OR tomorrow.

## 2019-06-26 NOTE — DIETITIAN INITIAL EVALUATION ADULT. - OTHER INFO
57y/o M admitted with CAD, EF Nl, pre-op CABG Thursday. Pt currently ordered for a Dash/TLC, CST CHO, Lacto-ovo veg. diet and reports a normal appetite and intake; consuming ~%. Denies N/V/D/C, pain, and mechanical issues with po intake. PTA pt reports eating a heart healthy diet and does not eat a lot of sugar/watches his CHO intake. Pt with pre-DM and reports A1c control 2/2 diet/exercise. Pt endorses that he has been successful in reduce ing A1c through modifying his diet, being active, and monitoring his portion sizes. Pt also reports that he is very aware of DM 2/2 familial history of DM. Pt is also a lacto-ovo vegetarian. Encouraged continued good diet practice and reinforced diet. Also discussed NPO for the OR and diet reinitiation post-op. Pt without questions. RD to follow per policy.

## 2019-06-26 NOTE — DIETITIAN INITIAL EVALUATION ADULT. - PROBLEM SELECTOR PLAN 4
Holding home Adderall 20mg QD at this time    DVT ppx: Heparin gtt  Dispo: Trend cardiac enzymes and discuss cardiac cath with Dr. Mjeias in AM

## 2019-06-26 NOTE — PROGRESS NOTE ADULT - SUBJECTIVE AND OBJECTIVE BOX
Planned Date of Surgery:       19                                                                                                           Surgeon: Lalito Boland    Procedure: OPCAB    HPI:  Patient feels "good" today.  He denies any CP, SOB, dizziness, N/V/D, fever/chills or other c/o at this time.  He is ambulatory and eating PO diet.      PAST MEDICAL & SURGICAL HISTORY:  Prediabetes  ADHD (attention deficit hyperactivity disorder)  HTN (hypertension)  H/O rotator cuff surgery      No Known Allergies      Physical Exam  T(C): 36.8 (19 @ 05:01), Max: 36.9 (19 @ 14:00)  HR: 64 (19 @ 09:04) (50 - 75)  BP: 98/68 (19 @ 09:04) (98/68 - 133/75)  RR: 12 (19 @ 09:04) (12 - 20)  SpO2: 98% (19 @ 09:04) (96% - 98%)    GEN: NAD, looks comfortable, well-developed, well-nourished.  On room air.    Psych: Mood appropriate  Neuro: A&Ox3.  No focal deficits.  Moving all extremities.   HEENT: No obvious abnormalities  CV: S1S2, regular, no murmurs appreciated.  No carotid bruits.  No JVD  Lungs: Clear B/L.  No wheezing, rales or rhonchi  ABD: Soft, non-tender, non-distended.  +Bowel sounds  EXT: Warm and well perfused.  No peripheral edema noted  Musculoskeletal: Moving all extremities with normal ROM, no joint swelling  PV: Pedal pulses palpable      MEDICATIONS  (STANDING):  aspirin enteric coated 81 milliGRAM(s) Oral daily  atorvastatin 80 milliGRAM(s) Oral at bedtime  chlorhexidine 0.12% Liquid 10 milliLiter(s) Swish and Spit once  chlorhexidine 4% Liquid 1 Application(s) Topical once  chlorhexidine 4% Liquid 1 Application(s) Topical once  heparin  Infusion 1200 Unit(s)/Hr (12 mL/Hr) IV Continuous <Continuous>  pantoprazole    Tablet 40 milliGRAM(s) Oral before breakfast  vortioxetine 10 milliGRAM(s) Oral daily    MEDICATIONS  (PRN):      On Beta Blocker?  No.  Contraindicated due to SB 50-55.      Labs:                        14.4   7.48  )-----------( 238      ( 2019 06:13 )             43.2     -    140  |  106  |  22  ----------------------------<  107<H>  4.3   |  22  |  0.95    Ca    9.5      2019 06:13  Mg     1.9           PT/INR - ( 2019 06:13 )   PT: 11.7 sec;   INR: 1.03          PTT - ( 2019 06:13 )  PTT:83.4 sec  Urinalysis Basic - ( 2019 06:17 )    Color: Yellow / Appearance: Clear / S.020 / pH: x  Gluc: x / Ketone: NEGATIVE  / Bili: Negative / Urobili: 0.2 E.U./dL   Blood: x / Protein: NEGATIVE mg/dL / Nitrite: NEGATIVE   Leuk Esterase: NEGATIVE / RBC: x / WBC x   Sq Epi: x / Non Sq Epi: x / Bacteria: x      ABO Interpretation: AB (19 @ 05:30)        Hgb A1C: 5.8%    EKG: < from: 12 Lead ECG (19 @ 08:53) >  Diagnosis Line Normal sinus rhythmwith sinus arrhythmia  Right bundle branch block  ST - T  abnormalities    < end of copied text >    CXR: < from: Xray Chest 2 Views PA/Lat (19 @ 18:53) >    Portable examination  of the chest demonstrates the heart to be within   normal limits in transverse diameter.No acute infiltrates. Visualized   osseous structures are within normal limits. Right nipple piercing noted.   Visualized osseous structures are within normal limits. Mild elevation   left hemidiaphragm.    Impression: No acute infiltrates    < end of copied text >    Cath Report: 3vCAD mLAD 80-90%, D1 70%, Ramus 70-80%, mLCx 80%, dRCA 90%,     Echo: < from: Echocardiogram (19 @ 11:13) >  .The left ventricular   wall   motion is normal.The left ventricular ejection fraction is estimated to   be   60-65%The left atrial size is normal.Right atrial size is normal.The   right   ventricle is normal in size and function.Structurally normal aortic   valve.No   aortic regurgitation noted.No hemodynamically significant valvular aortic   stenosis.No mitral regurgitation noted.There is no evidence for mitral   valve   stenosis.There is trace tricuspid regurgitation.The pulmonary artery   systolic   pressure is estimated to be 26 mmHg.There is no evidence for tricuspid   stenosis.No pulmonic regurgitation noted.There is no pulmonic   stenosis.The   inferior vena cava is normal in size (<2.1 cm) with normal inspiratory   collapse (>50%) consistent with normal right atrial pressure.  There is   no   pericardial effusion.    < end of copied text >    PFT's: Results on chart, reviewed.      Carotid Duplex: < from: US Duplex Carotid Arteries Complete, Bilateral (19 @ 19:13) >  IMPRESSION:  No hemodynamically significant stenosis.    < end of copied text >      Consult in Chart?  YES  Consent in Chart? YES  Pre-op Orders Placed? YES   Blood Prodeucts Ordered? YES   NPO ordered? YES

## 2019-06-27 ENCOUNTER — APPOINTMENT (OUTPATIENT)
Dept: CARDIOTHORACIC SURGERY | Facility: HOSPITAL | Age: 59
End: 2019-06-27
Payer: COMMERCIAL

## 2019-06-27 LAB
ALBUMIN SERPL ELPH-MCNC: 3.6 G/DL — SIGNIFICANT CHANGE UP (ref 3.3–5)
ALBUMIN SERPL ELPH-MCNC: 3.9 G/DL — SIGNIFICANT CHANGE UP (ref 3.3–5)
ALP SERPL-CCNC: 45 U/L — SIGNIFICANT CHANGE UP (ref 40–120)
ALP SERPL-CCNC: 47 U/L — SIGNIFICANT CHANGE UP (ref 40–120)
ALT FLD-CCNC: 32 U/L — SIGNIFICANT CHANGE UP (ref 10–45)
ALT FLD-CCNC: 34 U/L — SIGNIFICANT CHANGE UP (ref 10–45)
ANION GAP SERPL CALC-SCNC: 10 MMOL/L — SIGNIFICANT CHANGE UP (ref 5–17)
ANION GAP SERPL CALC-SCNC: 12 MMOL/L — SIGNIFICANT CHANGE UP (ref 5–17)
ANION GAP SERPL CALC-SCNC: 12 MMOL/L — SIGNIFICANT CHANGE UP (ref 5–17)
APTT BLD: 27 SEC — LOW (ref 27.5–36.3)
APTT BLD: 27.2 SEC — LOW (ref 27.5–36.3)
APTT BLD: 29.9 SEC — SIGNIFICANT CHANGE UP (ref 27.5–36.3)
APTT BLD: 58 SEC — HIGH (ref 27.5–36.3)
AST SERPL-CCNC: 102 U/L — HIGH (ref 10–40)
AST SERPL-CCNC: 82 U/L — HIGH (ref 10–40)
BASOPHILS # BLD AUTO: 0.02 K/UL — SIGNIFICANT CHANGE UP (ref 0–0.2)
BASOPHILS NFR BLD AUTO: 0.1 % — SIGNIFICANT CHANGE UP (ref 0–2)
BILIRUB SERPL-MCNC: 0.9 MG/DL — SIGNIFICANT CHANGE UP (ref 0.2–1.2)
BILIRUB SERPL-MCNC: 1 MG/DL — SIGNIFICANT CHANGE UP (ref 0.2–1.2)
BUN SERPL-MCNC: 14 MG/DL — SIGNIFICANT CHANGE UP (ref 7–23)
BUN SERPL-MCNC: 14 MG/DL — SIGNIFICANT CHANGE UP (ref 7–23)
BUN SERPL-MCNC: 15 MG/DL — SIGNIFICANT CHANGE UP (ref 7–23)
CALCIUM SERPL-MCNC: 8.7 MG/DL — SIGNIFICANT CHANGE UP (ref 8.4–10.5)
CALCIUM SERPL-MCNC: 9.3 MG/DL — SIGNIFICANT CHANGE UP (ref 8.4–10.5)
CALCIUM SERPL-MCNC: 9.8 MG/DL — SIGNIFICANT CHANGE UP (ref 8.4–10.5)
CHLORIDE SERPL-SCNC: 100 MMOL/L — SIGNIFICANT CHANGE UP (ref 96–108)
CHLORIDE SERPL-SCNC: 104 MMOL/L — SIGNIFICANT CHANGE UP (ref 96–108)
CHLORIDE SERPL-SCNC: 105 MMOL/L — SIGNIFICANT CHANGE UP (ref 96–108)
CO2 SERPL-SCNC: 23 MMOL/L — SIGNIFICANT CHANGE UP (ref 22–31)
CO2 SERPL-SCNC: 24 MMOL/L — SIGNIFICANT CHANGE UP (ref 22–31)
CO2 SERPL-SCNC: 25 MMOL/L — SIGNIFICANT CHANGE UP (ref 22–31)
CREAT SERPL-MCNC: 0.77 MG/DL — SIGNIFICANT CHANGE UP (ref 0.5–1.3)
CREAT SERPL-MCNC: 0.85 MG/DL — SIGNIFICANT CHANGE UP (ref 0.5–1.3)
CREAT SERPL-MCNC: 0.92 MG/DL — SIGNIFICANT CHANGE UP (ref 0.5–1.3)
EOSINOPHIL # BLD AUTO: 0.01 K/UL — SIGNIFICANT CHANGE UP (ref 0–0.5)
EOSINOPHIL NFR BLD AUTO: 0.1 % — SIGNIFICANT CHANGE UP (ref 0–6)
EXTRA GREEN TOP TUBE: SIGNIFICANT CHANGE UP
GAS PNL BLDA: SIGNIFICANT CHANGE UP
GLUCOSE BLDC GLUCOMTR-MCNC: 132 MG/DL — HIGH (ref 70–99)
GLUCOSE BLDC GLUCOMTR-MCNC: 134 MG/DL — HIGH (ref 70–99)
GLUCOSE BLDC GLUCOMTR-MCNC: 140 MG/DL — HIGH (ref 70–99)
GLUCOSE BLDC GLUCOMTR-MCNC: 141 MG/DL — HIGH (ref 70–99)
GLUCOSE BLDC GLUCOMTR-MCNC: 165 MG/DL — HIGH (ref 70–99)
GLUCOSE SERPL-MCNC: 140 MG/DL — HIGH (ref 70–99)
GLUCOSE SERPL-MCNC: 160 MG/DL — HIGH (ref 70–99)
GLUCOSE SERPL-MCNC: 160 MG/DL — HIGH (ref 70–99)
HCT VFR BLD CALC: 31.5 % — LOW (ref 39–50)
HCT VFR BLD CALC: 32.6 % — LOW (ref 39–50)
HCT VFR BLD CALC: 33.5 % — LOW (ref 39–50)
HGB BLD-MCNC: 10.7 G/DL — LOW (ref 13–17)
HGB BLD-MCNC: 10.8 G/DL — LOW (ref 13–17)
HGB BLD-MCNC: 11.2 G/DL — LOW (ref 13–17)
IMM GRANULOCYTES NFR BLD AUTO: 0.5 % — SIGNIFICANT CHANGE UP (ref 0–1.5)
INR BLD: 1.19 — HIGH (ref 0.88–1.16)
INR BLD: 1.27 — HIGH (ref 0.88–1.16)
INR BLD: 1.35 — HIGH (ref 0.88–1.16)
LACTATE SERPL-SCNC: 2.4 MMOL/L — HIGH (ref 0.5–2)
LACTATE SERPL-SCNC: 2.6 MMOL/L — HIGH (ref 0.5–2)
LACTATE SERPL-SCNC: 3.9 MMOL/L — HIGH (ref 0.5–2)
LYMPHOCYTES # BLD AUTO: 0.91 K/UL — LOW (ref 1–3.3)
LYMPHOCYTES # BLD AUTO: 5.7 % — LOW (ref 13–44)
MAGNESIUM SERPL-MCNC: 1.3 MG/DL — LOW (ref 1.6–2.6)
MAGNESIUM SERPL-MCNC: 1.3 MG/DL — LOW (ref 1.6–2.6)
MAGNESIUM SERPL-MCNC: 2.3 MG/DL — SIGNIFICANT CHANGE UP (ref 1.6–2.6)
MCHC RBC-ENTMCNC: 30 PG — SIGNIFICANT CHANGE UP (ref 27–34)
MCHC RBC-ENTMCNC: 30.4 PG — SIGNIFICANT CHANGE UP (ref 27–34)
MCHC RBC-ENTMCNC: 30.6 PG — SIGNIFICANT CHANGE UP (ref 27–34)
MCHC RBC-ENTMCNC: 33.1 GM/DL — SIGNIFICANT CHANGE UP (ref 32–36)
MCHC RBC-ENTMCNC: 33.4 GM/DL — SIGNIFICANT CHANGE UP (ref 32–36)
MCHC RBC-ENTMCNC: 34 GM/DL — SIGNIFICANT CHANGE UP (ref 32–36)
MCV RBC AUTO: 90 FL — SIGNIFICANT CHANGE UP (ref 80–100)
MCV RBC AUTO: 90.6 FL — SIGNIFICANT CHANGE UP (ref 80–100)
MCV RBC AUTO: 90.8 FL — SIGNIFICANT CHANGE UP (ref 80–100)
MONOCYTES # BLD AUTO: 0.96 K/UL — HIGH (ref 0–0.9)
MONOCYTES NFR BLD AUTO: 6.1 % — SIGNIFICANT CHANGE UP (ref 2–14)
NEUTROPHILS # BLD AUTO: 13.86 K/UL — HIGH (ref 1.8–7.4)
NEUTROPHILS NFR BLD AUTO: 87.5 % — HIGH (ref 43–77)
NRBC # BLD: 0 /100 WBCS — SIGNIFICANT CHANGE UP (ref 0–0)
PHOSPHATE SERPL-MCNC: 3.2 MG/DL — SIGNIFICANT CHANGE UP (ref 2.5–4.5)
PHOSPHATE SERPL-MCNC: 3.5 MG/DL — SIGNIFICANT CHANGE UP (ref 2.5–4.5)
PHOSPHATE SERPL-MCNC: 3.7 MG/DL — SIGNIFICANT CHANGE UP (ref 2.5–4.5)
PLATELET # BLD AUTO: 146 K/UL — LOW (ref 150–400)
PLATELET # BLD AUTO: 156 K/UL — SIGNIFICANT CHANGE UP (ref 150–400)
PLATELET # BLD AUTO: 166 K/UL — SIGNIFICANT CHANGE UP (ref 150–400)
POTASSIUM SERPL-MCNC: 4 MMOL/L — SIGNIFICANT CHANGE UP (ref 3.5–5.3)
POTASSIUM SERPL-MCNC: 4.4 MMOL/L — SIGNIFICANT CHANGE UP (ref 3.5–5.3)
POTASSIUM SERPL-MCNC: 4.8 MMOL/L — SIGNIFICANT CHANGE UP (ref 3.5–5.3)
POTASSIUM SERPL-SCNC: 4 MMOL/L — SIGNIFICANT CHANGE UP (ref 3.5–5.3)
POTASSIUM SERPL-SCNC: 4.4 MMOL/L — SIGNIFICANT CHANGE UP (ref 3.5–5.3)
POTASSIUM SERPL-SCNC: 4.8 MMOL/L — SIGNIFICANT CHANGE UP (ref 3.5–5.3)
PROT SERPL-MCNC: 5.6 G/DL — LOW (ref 6–8.3)
PROT SERPL-MCNC: 5.7 G/DL — LOW (ref 6–8.3)
PROTHROM AB SERPL-ACNC: 13.5 SEC — HIGH (ref 10–12.9)
PROTHROM AB SERPL-ACNC: 14.4 SEC — HIGH (ref 10–12.9)
PROTHROM AB SERPL-ACNC: 15.4 SEC — HIGH (ref 10–12.9)
RBC # BLD: 3.5 M/UL — LOW (ref 4.2–5.8)
RBC # BLD: 3.6 M/UL — LOW (ref 4.2–5.8)
RBC # BLD: 3.69 M/UL — LOW (ref 4.2–5.8)
RBC # FLD: 12.3 % — SIGNIFICANT CHANGE UP (ref 10.3–14.5)
RBC # FLD: 12.4 % — SIGNIFICANT CHANGE UP (ref 10.3–14.5)
RBC # FLD: 12.5 % — SIGNIFICANT CHANGE UP (ref 10.3–14.5)
SODIUM SERPL-SCNC: 135 MMOL/L — SIGNIFICANT CHANGE UP (ref 135–145)
SODIUM SERPL-SCNC: 139 MMOL/L — SIGNIFICANT CHANGE UP (ref 135–145)
SODIUM SERPL-SCNC: 141 MMOL/L — SIGNIFICANT CHANGE UP (ref 135–145)
WBC # BLD: 10.68 K/UL — HIGH (ref 3.8–10.5)
WBC # BLD: 12.2 K/UL — HIGH (ref 3.8–10.5)
WBC # BLD: 15.84 K/UL — HIGH (ref 3.8–10.5)
WBC # FLD AUTO: 10.68 K/UL — HIGH (ref 3.8–10.5)
WBC # FLD AUTO: 12.2 K/UL — HIGH (ref 3.8–10.5)
WBC # FLD AUTO: 15.84 K/UL — HIGH (ref 3.8–10.5)

## 2019-06-27 PROCEDURE — 33536 CABG ARTERIAL FOUR OR MORE: CPT | Mod: 80

## 2019-06-27 PROCEDURE — 99292 CRITICAL CARE ADDL 30 MIN: CPT

## 2019-06-27 PROCEDURE — 76998 US GUIDE INTRAOP: CPT | Mod: 26,AS

## 2019-06-27 PROCEDURE — 93010 ELECTROCARDIOGRAM REPORT: CPT

## 2019-06-27 PROCEDURE — 99291 CRITICAL CARE FIRST HOUR: CPT

## 2019-06-27 PROCEDURE — 76998 US GUIDE INTRAOP: CPT | Mod: 26,59

## 2019-06-27 PROCEDURE — 71045 X-RAY EXAM CHEST 1 VIEW: CPT | Mod: 26

## 2019-06-27 PROCEDURE — 33536 CABG ARTERIAL FOUR OR MORE: CPT | Mod: AS

## 2019-06-27 PROCEDURE — 33536 CABG ARTERIAL FOUR OR MORE: CPT

## 2019-06-27 RX ORDER — ONDANSETRON 8 MG/1
4 TABLET, FILM COATED ORAL ONCE
Refills: 0 | Status: COMPLETED | OUTPATIENT
Start: 2019-06-27 | End: 2019-06-27

## 2019-06-27 RX ORDER — CLOPIDOGREL BISULFATE 75 MG/1
75 TABLET, FILM COATED ORAL DAILY
Refills: 0 | Status: DISCONTINUED | OUTPATIENT
Start: 2019-06-27 | End: 2019-07-01

## 2019-06-27 RX ORDER — ALBUMIN HUMAN 25 %
250 VIAL (ML) INTRAVENOUS ONCE
Refills: 0 | Status: COMPLETED | OUTPATIENT
Start: 2019-06-27 | End: 2019-06-27

## 2019-06-27 RX ORDER — CEFAZOLIN SODIUM 1 G
2000 VIAL (EA) INJECTION EVERY 8 HOURS
Refills: 0 | Status: DISCONTINUED | OUTPATIENT
Start: 2019-06-27 | End: 2019-06-27

## 2019-06-27 RX ORDER — MAGNESIUM SULFATE 500 MG/ML
4 VIAL (ML) INJECTION ONCE
Refills: 0 | Status: COMPLETED | OUTPATIENT
Start: 2019-06-27 | End: 2019-06-27

## 2019-06-27 RX ORDER — CHLORHEXIDINE GLUCONATE 213 G/1000ML
5 SOLUTION TOPICAL EVERY 4 HOURS
Refills: 0 | Status: DISCONTINUED | OUTPATIENT
Start: 2019-06-27 | End: 2019-06-28

## 2019-06-27 RX ORDER — METOCLOPRAMIDE HCL 10 MG
10 TABLET ORAL ONCE
Refills: 0 | Status: COMPLETED | OUTPATIENT
Start: 2019-06-27 | End: 2019-06-27

## 2019-06-27 RX ORDER — CHLORHEXIDINE GLUCONATE 213 G/1000ML
15 SOLUTION TOPICAL EVERY 12 HOURS
Refills: 0 | Status: DISCONTINUED | OUTPATIENT
Start: 2019-06-27 | End: 2019-06-27

## 2019-06-27 RX ORDER — CEFAZOLIN SODIUM 1 G
2000 VIAL (EA) INJECTION EVERY 8 HOURS
Refills: 0 | Status: COMPLETED | OUTPATIENT
Start: 2019-06-27 | End: 2019-06-29

## 2019-06-27 RX ORDER — PROPOFOL 10 MG/ML
5 INJECTION, EMULSION INTRAVENOUS
Qty: 1000 | Refills: 0 | Status: DISCONTINUED | OUTPATIENT
Start: 2019-06-27 | End: 2019-06-28

## 2019-06-27 RX ORDER — PANTOPRAZOLE SODIUM 20 MG/1
40 TABLET, DELAYED RELEASE ORAL DAILY
Refills: 0 | Status: DISCONTINUED | OUTPATIENT
Start: 2019-06-27 | End: 2019-06-28

## 2019-06-27 RX ORDER — HEPARIN SODIUM 5000 [USP'U]/ML
5000 INJECTION INTRAVENOUS; SUBCUTANEOUS EVERY 8 HOURS
Refills: 0 | Status: DISCONTINUED | OUTPATIENT
Start: 2019-06-27 | End: 2019-07-01

## 2019-06-27 RX ORDER — SODIUM CHLORIDE 9 MG/ML
500 INJECTION, SOLUTION INTRAVENOUS ONCE
Refills: 0 | Status: COMPLETED | OUTPATIENT
Start: 2019-06-27 | End: 2019-06-27

## 2019-06-27 RX ORDER — FENTANYL CITRATE 50 UG/ML
50 INJECTION INTRAVENOUS
Refills: 0 | Status: DISCONTINUED | OUTPATIENT
Start: 2019-06-27 | End: 2019-06-28

## 2019-06-27 RX ORDER — DEXTROSE 50 % IN WATER 50 %
50 SYRINGE (ML) INTRAVENOUS
Refills: 0 | Status: DISCONTINUED | OUTPATIENT
Start: 2019-06-27 | End: 2019-06-28

## 2019-06-27 RX ORDER — ALBUMIN HUMAN 25 %
250 VIAL (ML) INTRAVENOUS ONCE
Refills: 0 | Status: COMPLETED | OUTPATIENT
Start: 2019-06-27 | End: 2019-06-28

## 2019-06-27 RX ORDER — INSULIN HUMAN 100 [IU]/ML
1 INJECTION, SOLUTION SUBCUTANEOUS
Qty: 50 | Refills: 0 | Status: DISCONTINUED | OUTPATIENT
Start: 2019-06-27 | End: 2019-06-28

## 2019-06-27 RX ORDER — CHLORHEXIDINE GLUCONATE 213 G/1000ML
1 SOLUTION TOPICAL DAILY
Refills: 0 | Status: DISCONTINUED | OUTPATIENT
Start: 2019-06-27 | End: 2019-06-28

## 2019-06-27 RX ORDER — SODIUM CHLORIDE 9 MG/ML
1000 INJECTION INTRAMUSCULAR; INTRAVENOUS; SUBCUTANEOUS
Refills: 0 | Status: DISCONTINUED | OUTPATIENT
Start: 2019-06-27 | End: 2019-07-01

## 2019-06-27 RX ORDER — DEXTROSE 50 % IN WATER 50 %
25 SYRINGE (ML) INTRAVENOUS
Refills: 0 | Status: DISCONTINUED | OUTPATIENT
Start: 2019-06-27 | End: 2019-06-28

## 2019-06-27 RX ORDER — ASPIRIN/CALCIUM CARB/MAGNESIUM 324 MG
81 TABLET ORAL DAILY
Refills: 0 | Status: DISCONTINUED | OUTPATIENT
Start: 2019-06-27 | End: 2019-07-01

## 2019-06-27 RX ORDER — DEXMEDETOMIDINE HYDROCHLORIDE IN 0.9% SODIUM CHLORIDE 4 UG/ML
0.2 INJECTION INTRAVENOUS
Qty: 200 | Refills: 0 | Status: DISCONTINUED | OUTPATIENT
Start: 2019-06-27 | End: 2019-06-28

## 2019-06-27 RX ORDER — ACETAMINOPHEN 500 MG
1000 TABLET ORAL ONCE
Refills: 0 | Status: COMPLETED | OUTPATIENT
Start: 2019-06-27 | End: 2019-06-27

## 2019-06-27 RX ADMIN — Medication 400 MILLIGRAM(S): at 19:22

## 2019-06-27 RX ADMIN — Medication 2000 MILLIGRAM(S): at 21:59

## 2019-06-27 RX ADMIN — PANTOPRAZOLE SODIUM 40 MILLIGRAM(S): 20 TABLET, DELAYED RELEASE ORAL at 06:26

## 2019-06-27 RX ADMIN — INSULIN HUMAN 1 UNIT(S)/HR: 100 INJECTION, SOLUTION SUBCUTANEOUS at 22:01

## 2019-06-27 RX ADMIN — FENTANYL CITRATE 50 MICROGRAM(S): 50 INJECTION INTRAVENOUS at 19:35

## 2019-06-27 RX ADMIN — CHLORHEXIDINE GLUCONATE 10 MILLILITER(S): 213 SOLUTION TOPICAL at 06:09

## 2019-06-27 RX ADMIN — SODIUM CHLORIDE 1000 MILLILITER(S): 9 INJECTION, SOLUTION INTRAVENOUS at 19:35

## 2019-06-27 RX ADMIN — Medication 100 GRAM(S): at 21:11

## 2019-06-27 RX ADMIN — DEXMEDETOMIDINE HYDROCHLORIDE IN 0.9% SODIUM CHLORIDE 4.31 MICROGRAM(S)/KG/HR: 4 INJECTION INTRAVENOUS at 19:19

## 2019-06-27 RX ADMIN — CLOPIDOGREL BISULFATE 75 MILLIGRAM(S): 75 TABLET, FILM COATED ORAL at 23:31

## 2019-06-27 RX ADMIN — PROPOFOL 2.59 MICROGRAM(S)/KG/MIN: 10 INJECTION, EMULSION INTRAVENOUS at 19:20

## 2019-06-27 RX ADMIN — ONDANSETRON 4 MILLIGRAM(S): 8 TABLET, FILM COATED ORAL at 22:00

## 2019-06-27 RX ADMIN — Medication 125 MILLILITER(S): at 23:45

## 2019-06-27 RX ADMIN — PANTOPRAZOLE SODIUM 40 MILLIGRAM(S): 20 TABLET, DELAYED RELEASE ORAL at 19:34

## 2019-06-27 RX ADMIN — Medication 10 MILLIGRAM(S): at 23:45

## 2019-06-27 RX ADMIN — Medication 81 MILLIGRAM(S): at 23:31

## 2019-06-27 RX ADMIN — CHLORHEXIDINE GLUCONATE 5 MILLILITER(S): 213 SOLUTION TOPICAL at 19:34

## 2019-06-27 RX ADMIN — FENTANYL CITRATE 50 MICROGRAM(S): 50 INJECTION INTRAVENOUS at 19:00

## 2019-06-27 RX ADMIN — CHLORHEXIDINE GLUCONATE 1 APPLICATION(S): 213 SOLUTION TOPICAL at 06:09

## 2019-06-27 RX ADMIN — Medication 1000 MILLIGRAM(S): at 19:35

## 2019-06-27 NOTE — BRIEF OPERATIVE NOTE - NSICDXBRIEFPROCEDURE_GEN_ALL_CORE_FT
PROCEDURES:  CABG, with ANGELA 27-Jun-2019 16:07:16 off pump cabg x 5 (lima-d1 seq lad, shruti-ramus, radial-lpa seq pda), EF 60% Abraham Gallardo

## 2019-06-27 NOTE — PROGRESS NOTE ADULT - SUBJECTIVE AND OBJECTIVE BOX
CTICU  CRITICAL  CARE  attending     Hand off received 					   Pertinent clinical, laboratory, radiographic, hemodynamic, echocardiographic, respiratory data, microbiologic data and chart were reviewed and analyzed frequently throughout the course of the day and night  Patient seen and examined with CTS/ SH attending at bedside  Pt is a 58y , Male, HEALTH ISSUES - PROBLEM Dx:  D-dimer, elevated: D-dimer, elevated  Prophylactic measure: Prophylactic measure  ADHD (attention deficit hyperactivity disorder): ADHD (attention deficit hyperactivity disorder)  Prediabetes: Prediabetes  HTN (hypertension): HTN (hypertension)  Acute coronary syndrome: Acute coronary syndrome      , FAMILY HISTORY:  FH: CAD (coronary artery disease)  PAST MEDICAL & SURGICAL HISTORY:  Prediabetes  ADHD (attention deficit hyperactivity disorder)  HTN (hypertension)  H/O rotator cuff surgery    Patient is a 58y old  Male who presents with a chief complaint of Chest Pain (28 Jun 2019 18:06)      14 system review was unremarkable  acute changes include acute respiratory failure  Vital signs, hemodynamic and respiratory parameters were reviewed from the bedside nursing flowsheet.  ICU Vital Signs Last 24 Hrs  T(C): 37.7 (28 Jun 2019 17:04), Max: 37.7 (28 Jun 2019 17:04)  T(F): 99.9 (28 Jun 2019 17:04), Max: 99.9 (28 Jun 2019 17:04)  HR: 82 (28 Jun 2019 20:35) (68 - 92)  BP: 112/63 (28 Jun 2019 20:35) (105/59 - 112/63)  BP(mean): 78 (28 Jun 2019 20:35) (71 - 78)  ABP: 124/42 (28 Jun 2019 15:00) (106/52 - 136/66)  ABP(mean): 60 (28 Jun 2019 15:00) (56 - 86)  RR: 16 (28 Jun 2019 20:35) (12 - 31)  SpO2: 98% (28 Jun 2019 20:35) (96% - 100%)    Adult Advanced Hemodynamics Last 24 Hrs  CVP(mm Hg): 0 (28 Jun 2019 15:00) (-2 - 7)  CVP(cm H2O): --  CO: --  CI: --  PA: --  PA(mean): --  PCWP: --  SVR: --  SVRI: --  PVR: --  PVRI: --, ABG - ( 28 Jun 2019 13:04 )  pH, Arterial: 7.49  pH, Blood: x     /  pCO2: 34    /  pO2: 89    / HCO3: 25    / Base Excess: 2.1   /  SaO2: 97                  Intake and output was reviewed and the fluid balance was calculated  Daily     Daily   I&O's Summary    27 Jun 2019 07:01  -  28 Jun 2019 07:00  --------------------------------------------------------  IN: 1346 mL / OUT: 3493 mL / NET: -2147 mL    28 Jun 2019 07:01  -  28 Jun 2019 20:45  --------------------------------------------------------  IN: 670 mL / OUT: 892 mL / NET: -222 mL        All lines and drain sites were assessed  [facility]      POCT Blood Glucose.: 144 mg/dL (28 Jun 2019 17:27)    No acute change in mental status  Auscultation of the chest reveals equal bs  Abdomen is soft  Extremities are warm and well perfused  Wounds appear clean and unremarkable  Antibiotics are periop    labs  CBC Full  -  ( 28 Jun 2019 13:17 )  WBC Count : 10.47 K/uL  RBC Count : 3.49 M/uL  Hemoglobin : 10.6 g/dL  Hematocrit : 31.9 %  Platelet Count - Automated : 157 K/uL  Mean Cell Volume : 91.4 fl  Mean Cell Hemoglobin : 30.4 pg  Mean Cell Hemoglobin Concentration : 33.2 gm/dL  Auto Neutrophil # : x  Auto Lymphocyte # : x  Auto Monocyte # : x  Auto Eosinophil # : x  Auto Basophil # : x  Auto Neutrophil % : x  Auto Lymphocyte % : x  Auto Monocyte % : x  Auto Eosinophil % : x  Auto Basophil % : x    06-28    139  |  104  |  14  ----------------------------<  140<H>  4.0   |  26  |  0.84    Ca    9.0      28 Jun 2019 13:17  Phos  2.2     06-28  Mg     2.0     06-28    TPro  6.0  /  Alb  4.2  /  TBili  0.9  /  DBili  x   /  AST  81<H>  /  ALT  32  /  AlkPhos  45  06-28    PT/INR - ( 28 Jun 2019 13:17 )   PT: 14.9 sec;   INR: 1.31          PTT - ( 28 Jun 2019 13:17 )  PTT:28.0 sec  The current medications were reviewed   MEDICATIONS  (STANDING):  aspirin enteric coated 81 milliGRAM(s) Oral daily  atorvastatin 80 milliGRAM(s) Oral at bedtime  ceFAZolin  Injectable. 2000 milliGRAM(s) IV Push every 8 hours  clopidogrel Tablet 75 milliGRAM(s) Oral daily  docusate sodium 100 milliGRAM(s) Oral three times a day  heparin  Injectable 5000 Unit(s) SubCutaneous every 8 hours  pantoprazole    Tablet 40 milliGRAM(s) Oral before breakfast  senna 1 Tablet(s) Oral at bedtime  sodium chloride 0.9% lock flush 3 milliLiter(s) IV Push every 8 hours  sodium chloride 0.9%. 1000 milliLiter(s) (10 mL/Hr) IV Continuous <Continuous>  vortioxetine 10 milliGRAM(s) Oral daily    MEDICATIONS  (PRN):  oxyCODONE    5 mG/acetaminophen 325 mG 1 Tablet(s) Oral every 6 hours PRN Moderate Pain (4 - 6)  oxyCODONE    5 mG/acetaminophen 325 mG 2 Tablet(s) Oral every 6 hours PRN Severe Pain (7 - 10)  polyethylene glycol 3350 17 Gram(s) Oral daily PRN Constipation  simethicone 80 milliGRAM(s) Chew three times a day PRN Gas       PROBLEM LIST/ ASSESSMENT:  HEALTH ISSUES - PROBLEM Dx:  D-dimer, elevated: D-dimer, elevated  Prophylactic measure: Prophylactic measure  ADHD (attention deficit hyperactivity disorder): ADHD (attention deficit hyperactivity disorder)  Prediabetes: Prediabetes  HTN (hypertension): HTN (hypertension)  Acute coronary syndrome: Acute coronary syndrome      ,   Patient is a 58y old  Male who presents with a chief complaint of Chest Pain (28 Jun 2019 18:06)     s/p cardiac surgery          My plan includes :  close hemodynamic, ventilatory and drain monitoring and management per post op routine    Monitor for arrhythmias and monitor parameters for organ perfusion  beta blockade not administered due to hemodynamic instability and bradycardia  monitor neurologic status  Head of the bed should remain elevated to 45 deg .   chest PT and IS will be encouraged  monitor adequacy of oxygenation and ventilation and attempt to wean oxygen  antibiotic regimen will be tailored to the clinical, laboratory and microbiologic data  Nutritional goals will be met using po eventually , ensure adequate caloric intake and montior the same  Stress ulcer and VTE prophylaxis will be achieved    Glycemic control is satisfactory  Electrolytes have been repleted as necessary and wound care has been carried out. Pain control has been achieved.   agressive physical therapy and early mobility and ambulation goals will be met   The family was updated about the course and plan  CRITICAL CARE TIME personally provided by me  in evaluation and management, reassessments, review and interpretation of labs and x-rays, ventilator and hemodynamic management, formulating a plan and coordinating care: ___90____ MIN.  Time does not include procedural time.  CTICU ATTENDING     					    Kurt Knapp MD

## 2019-06-27 NOTE — BRIEF OPERATIVE NOTE - OPERATION/FINDINGS
Operation:   EF: Operation: OPCABG x 5 (lima-d1 seq lad, shruti-ramus, radial-lpa seq pda), EF 60%  left radial artery harvest time: 50 min, prep time: 0

## 2019-06-28 VITALS — BODY MASS INDEX: 25.68 KG/M2 | WEIGHT: 189.6 LBS | HEIGHT: 72.05 IN

## 2019-06-28 LAB
ALBUMIN SERPL ELPH-MCNC: 4.2 G/DL — SIGNIFICANT CHANGE UP (ref 3.3–5)
ALP SERPL-CCNC: 45 U/L — SIGNIFICANT CHANGE UP (ref 40–120)
ALT FLD-CCNC: 32 U/L — SIGNIFICANT CHANGE UP (ref 10–45)
ANION GAP SERPL CALC-SCNC: 10 MMOL/L — SIGNIFICANT CHANGE UP (ref 5–17)
ANION GAP SERPL CALC-SCNC: 9 MMOL/L — SIGNIFICANT CHANGE UP (ref 5–17)
APTT BLD: 27 SEC — LOW (ref 27.5–36.3)
APTT BLD: 28 SEC — SIGNIFICANT CHANGE UP (ref 27.5–36.3)
AST SERPL-CCNC: 81 U/L — HIGH (ref 10–40)
BILIRUB SERPL-MCNC: 0.9 MG/DL — SIGNIFICANT CHANGE UP (ref 0.2–1.2)
BUN SERPL-MCNC: 13 MG/DL — SIGNIFICANT CHANGE UP (ref 7–23)
BUN SERPL-MCNC: 14 MG/DL — SIGNIFICANT CHANGE UP (ref 7–23)
CALCIUM SERPL-MCNC: 9 MG/DL — SIGNIFICANT CHANGE UP (ref 8.4–10.5)
CALCIUM SERPL-MCNC: 9.1 MG/DL — SIGNIFICANT CHANGE UP (ref 8.4–10.5)
CHLORIDE SERPL-SCNC: 104 MMOL/L — SIGNIFICANT CHANGE UP (ref 96–108)
CHLORIDE SERPL-SCNC: 104 MMOL/L — SIGNIFICANT CHANGE UP (ref 96–108)
CO2 SERPL-SCNC: 26 MMOL/L — SIGNIFICANT CHANGE UP (ref 22–31)
CO2 SERPL-SCNC: 26 MMOL/L — SIGNIFICANT CHANGE UP (ref 22–31)
CREAT SERPL-MCNC: 0.82 MG/DL — SIGNIFICANT CHANGE UP (ref 0.5–1.3)
CREAT SERPL-MCNC: 0.84 MG/DL — SIGNIFICANT CHANGE UP (ref 0.5–1.3)
GAS PNL BLDA: SIGNIFICANT CHANGE UP
GAS PNL BLDA: SIGNIFICANT CHANGE UP
GLUCOSE BLDC GLUCOMTR-MCNC: 108 MG/DL — HIGH (ref 70–99)
GLUCOSE BLDC GLUCOMTR-MCNC: 113 MG/DL — HIGH (ref 70–99)
GLUCOSE BLDC GLUCOMTR-MCNC: 114 MG/DL — HIGH (ref 70–99)
GLUCOSE BLDC GLUCOMTR-MCNC: 118 MG/DL — HIGH (ref 70–99)
GLUCOSE BLDC GLUCOMTR-MCNC: 118 MG/DL — HIGH (ref 70–99)
GLUCOSE BLDC GLUCOMTR-MCNC: 125 MG/DL — HIGH (ref 70–99)
GLUCOSE BLDC GLUCOMTR-MCNC: 144 MG/DL — HIGH (ref 70–99)
GLUCOSE SERPL-MCNC: 115 MG/DL — HIGH (ref 70–99)
GLUCOSE SERPL-MCNC: 140 MG/DL — HIGH (ref 70–99)
HCT VFR BLD CALC: 31.4 % — LOW (ref 39–50)
HCT VFR BLD CALC: 31.9 % — LOW (ref 39–50)
HGB BLD-MCNC: 10.6 G/DL — LOW (ref 13–17)
HGB BLD-MCNC: 10.6 G/DL — LOW (ref 13–17)
INR BLD: 1.27 — HIGH (ref 0.88–1.16)
INR BLD: 1.31 — HIGH (ref 0.88–1.16)
LACTATE SERPL-SCNC: 1.8 MMOL/L — SIGNIFICANT CHANGE UP (ref 0.5–2)
LACTATE SERPL-SCNC: 1.9 MMOL/L — SIGNIFICANT CHANGE UP (ref 0.5–2)
MAGNESIUM SERPL-MCNC: 2 MG/DL — SIGNIFICANT CHANGE UP (ref 1.6–2.6)
MAGNESIUM SERPL-MCNC: 2.2 MG/DL — SIGNIFICANT CHANGE UP (ref 1.6–2.6)
MCHC RBC-ENTMCNC: 30.3 PG — SIGNIFICANT CHANGE UP (ref 27–34)
MCHC RBC-ENTMCNC: 30.4 PG — SIGNIFICANT CHANGE UP (ref 27–34)
MCHC RBC-ENTMCNC: 33.2 GM/DL — SIGNIFICANT CHANGE UP (ref 32–36)
MCHC RBC-ENTMCNC: 33.8 GM/DL — SIGNIFICANT CHANGE UP (ref 32–36)
MCV RBC AUTO: 89.7 FL — SIGNIFICANT CHANGE UP (ref 80–100)
MCV RBC AUTO: 91.4 FL — SIGNIFICANT CHANGE UP (ref 80–100)
NRBC # BLD: 0 /100 WBCS — SIGNIFICANT CHANGE UP (ref 0–0)
NRBC # BLD: 0 /100 WBCS — SIGNIFICANT CHANGE UP (ref 0–0)
PHOSPHATE SERPL-MCNC: 2.2 MG/DL — LOW (ref 2.5–4.5)
PHOSPHATE SERPL-MCNC: 3.3 MG/DL — SIGNIFICANT CHANGE UP (ref 2.5–4.5)
PLATELET # BLD AUTO: 149 K/UL — LOW (ref 150–400)
PLATELET # BLD AUTO: 157 K/UL — SIGNIFICANT CHANGE UP (ref 150–400)
POTASSIUM SERPL-MCNC: 4 MMOL/L — SIGNIFICANT CHANGE UP (ref 3.5–5.3)
POTASSIUM SERPL-MCNC: 4.4 MMOL/L — SIGNIFICANT CHANGE UP (ref 3.5–5.3)
POTASSIUM SERPL-SCNC: 4 MMOL/L — SIGNIFICANT CHANGE UP (ref 3.5–5.3)
POTASSIUM SERPL-SCNC: 4.4 MMOL/L — SIGNIFICANT CHANGE UP (ref 3.5–5.3)
PROT SERPL-MCNC: 6 G/DL — SIGNIFICANT CHANGE UP (ref 6–8.3)
PROTHROM AB SERPL-ACNC: 14.5 SEC — HIGH (ref 10–12.9)
PROTHROM AB SERPL-ACNC: 14.9 SEC — HIGH (ref 10–12.9)
RBC # BLD: 3.49 M/UL — LOW (ref 4.2–5.8)
RBC # BLD: 3.5 M/UL — LOW (ref 4.2–5.8)
RBC # FLD: 12.4 % — SIGNIFICANT CHANGE UP (ref 10.3–14.5)
RBC # FLD: 12.7 % — SIGNIFICANT CHANGE UP (ref 10.3–14.5)
SODIUM SERPL-SCNC: 139 MMOL/L — SIGNIFICANT CHANGE UP (ref 135–145)
SODIUM SERPL-SCNC: 140 MMOL/L — SIGNIFICANT CHANGE UP (ref 135–145)
WBC # BLD: 10.02 K/UL — SIGNIFICANT CHANGE UP (ref 3.8–10.5)
WBC # BLD: 10.47 K/UL — SIGNIFICANT CHANGE UP (ref 3.8–10.5)
WBC # FLD AUTO: 10.02 K/UL — SIGNIFICANT CHANGE UP (ref 3.8–10.5)
WBC # FLD AUTO: 10.47 K/UL — SIGNIFICANT CHANGE UP (ref 3.8–10.5)

## 2019-06-28 PROCEDURE — 71045 X-RAY EXAM CHEST 1 VIEW: CPT | Mod: 26,76

## 2019-06-28 RX ORDER — POLYETHYLENE GLYCOL 3350 17 G/17G
17 POWDER, FOR SOLUTION ORAL DAILY
Refills: 0 | Status: DISCONTINUED | OUTPATIENT
Start: 2019-06-28 | End: 2019-07-01

## 2019-06-28 RX ORDER — SODIUM CHLORIDE 9 MG/ML
1000 INJECTION, SOLUTION INTRAVENOUS
Refills: 0 | Status: DISCONTINUED | OUTPATIENT
Start: 2019-06-28 | End: 2019-06-28

## 2019-06-28 RX ORDER — ALBUMIN HUMAN 25 %
250 VIAL (ML) INTRAVENOUS ONCE
Refills: 0 | Status: COMPLETED | OUTPATIENT
Start: 2019-06-28 | End: 2019-06-28

## 2019-06-28 RX ORDER — AMLODIPINE BESYLATE 2.5 MG/1
2.5 TABLET ORAL DAILY
Refills: 0 | Status: DISCONTINUED | OUTPATIENT
Start: 2019-06-28 | End: 2019-06-28

## 2019-06-28 RX ORDER — POTASSIUM CHLORIDE 20 MEQ
20 PACKET (EA) ORAL ONCE
Refills: 0 | Status: COMPLETED | OUTPATIENT
Start: 2019-06-28 | End: 2019-06-28

## 2019-06-28 RX ORDER — KETOROLAC TROMETHAMINE 30 MG/ML
30 SYRINGE (ML) INJECTION ONCE
Refills: 0 | Status: DISCONTINUED | OUTPATIENT
Start: 2019-06-28 | End: 2019-06-28

## 2019-06-28 RX ORDER — OXYCODONE AND ACETAMINOPHEN 5; 325 MG/1; MG/1
2 TABLET ORAL EVERY 6 HOURS
Refills: 0 | Status: DISCONTINUED | OUTPATIENT
Start: 2019-06-28 | End: 2019-07-01

## 2019-06-28 RX ORDER — SIMETHICONE 80 MG/1
80 TABLET, CHEWABLE ORAL THREE TIMES A DAY
Refills: 0 | Status: DISCONTINUED | OUTPATIENT
Start: 2019-06-28 | End: 2019-07-01

## 2019-06-28 RX ORDER — GLUCAGON INJECTION, SOLUTION 0.5 MG/.1ML
1 INJECTION, SOLUTION SUBCUTANEOUS ONCE
Refills: 0 | Status: DISCONTINUED | OUTPATIENT
Start: 2019-06-28 | End: 2019-06-28

## 2019-06-28 RX ORDER — FENTANYL CITRATE 50 UG/ML
12.5 INJECTION INTRAVENOUS ONCE
Refills: 0 | Status: DISCONTINUED | OUTPATIENT
Start: 2019-06-28 | End: 2019-06-28

## 2019-06-28 RX ORDER — METOCLOPRAMIDE HCL 10 MG
10 TABLET ORAL ONCE
Refills: 0 | Status: COMPLETED | OUTPATIENT
Start: 2019-06-28 | End: 2019-06-28

## 2019-06-28 RX ORDER — PANTOPRAZOLE SODIUM 20 MG/1
40 TABLET, DELAYED RELEASE ORAL
Refills: 0 | Status: DISCONTINUED | OUTPATIENT
Start: 2019-06-28 | End: 2019-07-01

## 2019-06-28 RX ORDER — SENNA PLUS 8.6 MG/1
1 TABLET ORAL AT BEDTIME
Refills: 0 | Status: DISCONTINUED | OUTPATIENT
Start: 2019-06-28 | End: 2019-07-01

## 2019-06-28 RX ORDER — INSULIN LISPRO 100/ML
VIAL (ML) SUBCUTANEOUS
Refills: 0 | Status: DISCONTINUED | OUTPATIENT
Start: 2019-06-28 | End: 2019-06-28

## 2019-06-28 RX ORDER — ACETAMINOPHEN 500 MG
1000 TABLET ORAL ONCE
Refills: 0 | Status: COMPLETED | OUTPATIENT
Start: 2019-06-28 | End: 2019-06-28

## 2019-06-28 RX ORDER — DEXTROSE 50 % IN WATER 50 %
15 SYRINGE (ML) INTRAVENOUS ONCE
Refills: 0 | Status: DISCONTINUED | OUTPATIENT
Start: 2019-06-28 | End: 2019-06-28

## 2019-06-28 RX ORDER — DOCUSATE SODIUM 100 MG
100 CAPSULE ORAL THREE TIMES A DAY
Refills: 0 | Status: DISCONTINUED | OUTPATIENT
Start: 2019-06-28 | End: 2019-07-01

## 2019-06-28 RX ORDER — SODIUM CHLORIDE 9 MG/ML
3 INJECTION INTRAMUSCULAR; INTRAVENOUS; SUBCUTANEOUS EVERY 8 HOURS
Refills: 0 | Status: DISCONTINUED | OUTPATIENT
Start: 2019-06-28 | End: 2019-07-01

## 2019-06-28 RX ORDER — SODIUM CHLORIDE 9 MG/ML
500 INJECTION, SOLUTION INTRAVENOUS ONCE
Refills: 0 | Status: COMPLETED | OUTPATIENT
Start: 2019-06-28 | End: 2019-06-28

## 2019-06-28 RX ORDER — OXYCODONE AND ACETAMINOPHEN 5; 325 MG/1; MG/1
1 TABLET ORAL EVERY 6 HOURS
Refills: 0 | Status: DISCONTINUED | OUTPATIENT
Start: 2019-06-28 | End: 2019-07-01

## 2019-06-28 RX ADMIN — SENNA PLUS 1 TABLET(S): 8.6 TABLET ORAL at 22:23

## 2019-06-28 RX ADMIN — Medication 400 MILLIGRAM(S): at 10:35

## 2019-06-28 RX ADMIN — VORTIOXETINE 10 MILLIGRAM(S): 5 TABLET, FILM COATED ORAL at 11:27

## 2019-06-28 RX ADMIN — Medication 1000 MILLIGRAM(S): at 11:05

## 2019-06-28 RX ADMIN — HEPARIN SODIUM 5000 UNIT(S): 5000 INJECTION INTRAVENOUS; SUBCUTANEOUS at 13:47

## 2019-06-28 RX ADMIN — SODIUM CHLORIDE 3 MILLILITER(S): 9 INJECTION INTRAMUSCULAR; INTRAVENOUS; SUBCUTANEOUS at 21:14

## 2019-06-28 RX ADMIN — FENTANYL CITRATE 50 MICROGRAM(S): 50 INJECTION INTRAVENOUS at 03:48

## 2019-06-28 RX ADMIN — Medication 2000 MILLIGRAM(S): at 05:42

## 2019-06-28 RX ADMIN — AMLODIPINE BESYLATE 2.5 MILLIGRAM(S): 2.5 TABLET ORAL at 15:20

## 2019-06-28 RX ADMIN — Medication 2000 MILLIGRAM(S): at 22:23

## 2019-06-28 RX ADMIN — HEPARIN SODIUM 5000 UNIT(S): 5000 INJECTION INTRAVENOUS; SUBCUTANEOUS at 22:23

## 2019-06-28 RX ADMIN — Medication 125 MILLILITER(S): at 00:07

## 2019-06-28 RX ADMIN — Medication 2000 MILLIGRAM(S): at 13:48

## 2019-06-28 RX ADMIN — FENTANYL CITRATE 12.5 MICROGRAM(S): 50 INJECTION INTRAVENOUS at 10:35

## 2019-06-28 RX ADMIN — SODIUM CHLORIDE 3 MILLILITER(S): 9 INJECTION INTRAMUSCULAR; INTRAVENOUS; SUBCUTANEOUS at 15:18

## 2019-06-28 RX ADMIN — Medication 30 MILLIGRAM(S): at 19:42

## 2019-06-28 RX ADMIN — PANTOPRAZOLE SODIUM 40 MILLIGRAM(S): 20 TABLET, DELAYED RELEASE ORAL at 08:00

## 2019-06-28 RX ADMIN — Medication 10 MILLIGRAM(S): at 18:22

## 2019-06-28 RX ADMIN — HEPARIN SODIUM 5000 UNIT(S): 5000 INJECTION INTRAVENOUS; SUBCUTANEOUS at 05:42

## 2019-06-28 RX ADMIN — Medication 100 MILLIGRAM(S): at 22:23

## 2019-06-28 RX ADMIN — SODIUM CHLORIDE 1000 MILLILITER(S): 9 INJECTION, SOLUTION INTRAVENOUS at 07:54

## 2019-06-28 RX ADMIN — Medication 30 MILLIGRAM(S): at 18:47

## 2019-06-28 RX ADMIN — ATORVASTATIN CALCIUM 80 MILLIGRAM(S): 80 TABLET, FILM COATED ORAL at 22:23

## 2019-06-28 RX ADMIN — CLOPIDOGREL BISULFATE 75 MILLIGRAM(S): 75 TABLET, FILM COATED ORAL at 11:27

## 2019-06-28 RX ADMIN — OXYCODONE AND ACETAMINOPHEN 2 TABLET(S): 5; 325 TABLET ORAL at 16:56

## 2019-06-28 RX ADMIN — OXYCODONE AND ACETAMINOPHEN 1 TABLET(S): 5; 325 TABLET ORAL at 07:45

## 2019-06-28 RX ADMIN — Medication 81 MILLIGRAM(S): at 11:27

## 2019-06-28 RX ADMIN — SIMETHICONE 80 MILLIGRAM(S): 80 TABLET, CHEWABLE ORAL at 18:22

## 2019-06-28 RX ADMIN — Medication 100 MILLIGRAM(S): at 13:47

## 2019-06-28 RX ADMIN — OXYCODONE AND ACETAMINOPHEN 2 TABLET(S): 5; 325 TABLET ORAL at 16:26

## 2019-06-28 RX ADMIN — Medication 125 MILLILITER(S): at 06:51

## 2019-06-28 RX ADMIN — FENTANYL CITRATE 12.5 MICROGRAM(S): 50 INJECTION INTRAVENOUS at 11:05

## 2019-06-28 RX ADMIN — FENTANYL CITRATE 50 MICROGRAM(S): 50 INJECTION INTRAVENOUS at 03:33

## 2019-06-28 RX ADMIN — Medication 100 MILLIEQUIVALENT(S): at 01:00

## 2019-06-28 NOTE — PHYSICAL THERAPY INITIAL EVALUATION ADULT - PLANNED THERAPY INTERVENTIONS, PT EVAL
bed mobility training/postural re-education/transfer training/gait training/balance training/strengthening

## 2019-06-28 NOTE — PHYSICAL THERAPY INITIAL EVALUATION ADULT - IMPAIRMENTS FOUND, PT EVAL
ergonomics and body mechanics/muscle strength/gait, locomotion, and balance/aerobic capacity/endurance/posture

## 2019-06-28 NOTE — PROGRESS NOTE ADULT - ASSESSMENT
59 y/o M former drug use, sober x 25 years, with FHx of CAD and PMHx of RBBB, HTN, prediabetes and ADHD, previously on Truvada as PrEP, who presented to [facility] ED on 6/22/19 c/o acute onset CP and fatigue.  He was ruled out for PE, but ruled in for NSTEMI. Cardiac cath 6/24/19 with Dr. Peng with 3vCAD mLAD 80-90%, D1 70%, Ramus 70-80%, mLCx 80%, dRCA 90%, and we are being consulted for surgical evaluation.  On 6/27/19, pt underwent uncomplicated OPCABx5. POD#0 extubated. POD#1, b/l pleural CTs removed, small right apical PTX, Mediastinal CT kept to suction. Today POD#1, pt transferred to  from .     A/P:  Neurovascular: No delirium. Pain well controlled with current regimen.  -continue home Trintellix for depression  - continue PRN percocet and tylenol    Cardiovascular: Hemodynamically stable. HR controlled. POD#1 OPCAB   -continue to monitor BP/HR/Tele   - continue lipitor 80mg PO QD, and ASA/Plavix     Respiratory: 02 Sat = 99% on 4LNC. small right apical PTX s/p CT removal  - continue medistinal CT on suction, f/u PM CXR   -If on oxygen wean to RA from for O2 Sat > 93%.  -Encourage C+DB and Use of IS 10x / hr while awake.  -CXR- f/u tomorrow AM PA/Lat CXR     GI: c/o gas pain- given reglan and simethacone  -PPX- protonix  -PO Diet.    Renal / : continue to trend daily labs BUN/Cr: 14/0.84  -Monitor renal function.  -Monitor I/O's.    Endocrine:  no h/o DM or thyroid disease   -A1c- 5.8  -TSH- 0.7    Hematologic: continue to trend daily labs H/H 10.6/31.9    ID: no acute issues   -Temp- afebrile  -CBC- 10  -Observe for SIRS/Sepsis Syndrome.    Prophylaxis:  -DVT prophylaxis with 5000 SubQ Heparin q8h.  -SCD's    Disposition:  -transfer to  from  59 y/o M former drug use, sober x 25 years, with FHx of CAD and PMHx of RBBB, HTN, prediabetes and ADHD, previously on Truvada as PrEP, who presented to [facility] ED on 6/22/19 c/o acute onset CP and fatigue.  He was ruled out for PE, but ruled in for NSTEMI. Cardiac cath 6/24/19 with Dr. Peng with 3vCAD mLAD 80-90%, D1 70%, Ramus 70-80%, mLCx 80%, dRCA 90%, and we are being consulted for surgical evaluation.  On 6/27/19, pt underwent uncomplicated OPCABx5. POD#0 extubated. POD#1, b/l pleural CTs removed, small right apical PTX, Mediastinal CT kept to suction. Today POD#1, pt transferred to  from .     A/P:  Neurovascular: No delirium. Pain well controlled with current regimen.  -continue home Trintellix for depression  - continue PRN percocet and tylenol    Cardiovascular: Hemodynamically stable. HR controlled. POD#1 OPCAB   -continue to monitor BP/HR/Tele   - continue lipitor 80mg PO QD, and ASA/Plavix   - arterial harvest start CCB when BP tolerates, then may start BB as per Dr. Boland    Respiratory: 02 Sat = 99% on 4LNC. small right apical PTX s/p CT removal  - continue medistinal CT on suction, f/u PM CXR   -If on oxygen wean to RA from for O2 Sat > 93%.  -Encourage C+DB and Use of IS 10x / hr while awake.  -CXR- f/u tomorrow AM PA/Lat CXR     GI: c/o gas pain- given reglan and simethacone  -PPX- protonix  -PO Diet.    Renal / : continue to trend daily labs BUN/Cr: 14/0.84  -Monitor renal function.  -Monitor I/O's.    Endocrine:  no h/o DM or thyroid disease   -A1c- 5.8  -TSH- 0.7    Hematologic: continue to trend daily labs H/H 10.6/31.9    ID: no acute issues   -Temp- afebrile  -CBC- 10  -Observe for SIRS/Sepsis Syndrome.    Prophylaxis:  -DVT prophylaxis with 5000 SubQ Heparin q8h.  -SCD's    Disposition:  -transfer to  from 9E

## 2019-06-28 NOTE — PHYSICAL THERAPY INITIAL EVALUATION ADULT - MODALITIES TREATMENT COMMENTS
incentive spirometer use: 500CCs x 10, splinted coughing x 5 reps, LE HEP (AP, HS, LAQ, GS x 10 each b/l)

## 2019-06-28 NOTE — PHYSICAL THERAPY INITIAL EVALUATION ADULT - GENERAL OBSERVATIONS, REHAB EVAL
Patient encountered seated OOB in recliner chair, NAD, +CB, +CTx2, +TLC, +story, +A-Line, +EKG, RN cleared patient to ambulate with PT, +NC02@4L.

## 2019-06-28 NOTE — PHYSICAL THERAPY INITIAL EVALUATION ADULT - PERTINENT HX OF CURRENT PROBLEM, REHAB EVAL
Patient is a 59 y/o M  who presented to [facility] ED on 6/22/19 c/o midsternal, non-radiating, chest discomfort, 7/10 in severity with associated diaphoresis and fatigue that began 1 hour prior to arrival. Pt describes chest discomfort as burning/pressure sensation and that he had a similar episode a couple days earlier.

## 2019-06-28 NOTE — PHYSICAL THERAPY INITIAL EVALUATION ADULT - ADDITIONAL COMMENTS
Patient lives alone in elevator building with no steps to enter. Patient reports no prior use of DME.

## 2019-06-28 NOTE — PROGRESS NOTE ADULT - SUBJECTIVE AND OBJECTIVE BOX
Patient discussed on morning rounds with Dr. Boland       Operation / Date: 6/27/19 OPCABx5 EF60%    SUBJECTIVE ASSESSMENT:  58y Male seen and examined at the bedside.     Vital Signs Last 24 Hrs  T(C): 37.7 (28 Jun 2019 17:04), Max: 37.7 (28 Jun 2019 17:04)  T(F): 99.9 (28 Jun 2019 17:04), Max: 99.9 (28 Jun 2019 17:04)  HR: 86 (28 Jun 2019 17:06) (66 - 92)  BP: 105/59 (28 Jun 2019 17:06) (105/59 - 105/59)  BP(mean): 71 (28 Jun 2019 17:06) (71 - 71)  RR: 12 (28 Jun 2019 17:06) (12 - 31)  SpO2: 99% (28 Jun 2019 17:06) (96% - 100%)  I&O's Detail    27 Jun 2019 07:01  -  28 Jun 2019 07:00  --------------------------------------------------------  IN:    Albumin 5%  - 250 mL: 750 mL    dexmedetomidine Infusion: 76 mL    insulin regular Infusion: 15 mL    IV PiggyBack: 300 mL    propofol Infusion: 45 mL    sodium chloride 0.9%.: 160 mL  Total IN: 1346 mL    OUT:    Chest Tube: 68 mL    Chest Tube: 420 mL    Indwelling Catheter - Urethral: 3005 mL  Total OUT: 3493 mL    Total NET: -2147 mL      28 Jun 2019 07:01  -  28 Jun 2019 18:07  --------------------------------------------------------  IN:    IV PiggyBack: 500 mL    sodium chloride 0.9%.: 70 mL  Total IN: 570 mL    OUT:    Chest Tube: 92 mL    Chest Tube: 30 mL    Indwelling Catheter - Urethral: 720 mL  Total OUT: 842 mL    Total NET: -272 mL      CHEST TUBE:  Yes. AIR LEAKS: No. Suction   AMARILIS DRAIN:  No.  EPICARDIAL WIRES: Yes  TIE DOWNS: Yes  PABLO: No.    PHYSICAL EXAM:    General:   Neurological:  Cardiovascular:  Respiratory:  Gastrointestinal:  Extremities:  Vascular:  Incision Sites:    LABS:                        10.6   10.47 )-----------( 157      ( 28 Jun 2019 13:17 )             31.9       COUMADIN:  No.     PT/INR - ( 28 Jun 2019 13:17 )   PT: 14.9 sec;   INR: 1.31      PTT - ( 28 Jun 2019 13:17 )  PTT:28.0 sec    06-28    139  |  104  |  14  ----------------------------<  140<H>  4.0   |  26  |  0.84    Ca    9.0      28 Jun 2019 13:17  Phos  2.2     06-28  Mg     2.0     06-28    TPro  6.0  /  Alb  4.2  /  TBili  0.9  /  DBili  x   /  AST  81<H>  /  ALT  32  /  AlkPhos  45  06-28      MEDICATIONS  (STANDING):  aspirin enteric coated 81 milliGRAM(s) Oral daily  atorvastatin 80 milliGRAM(s) Oral at bedtime  ceFAZolin  Injectable. 2000 milliGRAM(s) IV Push every 8 hours  clopidogrel Tablet 75 milliGRAM(s) Oral daily  dextrose 5%. 1000 milliLiter(s) (50 mL/Hr) IV Continuous <Continuous>  dextrose 50% Injectable 50 milliLiter(s) IV Push every 15 minutes  dextrose 50% Injectable 25 milliLiter(s) IV Push every 15 minutes  docusate sodium 100 milliGRAM(s) Oral three times a day  heparin  Injectable 5000 Unit(s) SubCutaneous every 8 hours  insulin lispro (HumaLOG) corrective regimen sliding scale   SubCutaneous Before meals and at bedtime  metoclopramide Injectable 10 milliGRAM(s) IV Push once  pantoprazole    Tablet 40 milliGRAM(s) Oral before breakfast  senna 1 Tablet(s) Oral at bedtime  sodium chloride 0.9% lock flush 3 milliLiter(s) IV Push every 8 hours  sodium chloride 0.9%. 1000 milliLiter(s) (10 mL/Hr) IV Continuous <Continuous>  vortioxetine 10 milliGRAM(s) Oral daily    MEDICATIONS  (PRN):  dextrose 40% Gel 15 Gram(s) Oral once PRN Blood Glucose LESS THAN 70 milliGRAM(s)/deciliter  glucagon  Injectable 1 milliGRAM(s) IntraMuscular once PRN Glucose LESS THAN 70 milligrams/deciliter  oxyCODONE    5 mG/acetaminophen 325 mG 1 Tablet(s) Oral every 6 hours PRN Moderate Pain (4 - 6)  oxyCODONE    5 mG/acetaminophen 325 mG 2 Tablet(s) Oral every 6 hours PRN Severe Pain (7 - 10)  simethicone 80 milliGRAM(s) Chew three times a day PRN Gas        RADIOLOGY & ADDITIONAL TESTS:  CXR: < from: Xray Chest 1 View- PORTABLE-Urgent (06.28.19 @ 14:11) >  IMPRESSION:  Improved bibasilar atelectasis and small right pleural effusion. Small   right apical pneumothorax, unchanged.    < end of copied text > Patient discussed on morning rounds with Dr. Boland       Operation / Date: 6/27/19 OPCABx5 EF60%    SUBJECTIVE ASSESSMENT:  58y Male seen and examined at the bedside. pt c/o right shoulder pain and incisional pain, recently given toradol. denies CP or SOB.     Vital Signs Last 24 Hrs  T(C): 37.7 (28 Jun 2019 17:04), Max: 37.7 (28 Jun 2019 17:04)  T(F): 99.9 (28 Jun 2019 17:04), Max: 99.9 (28 Jun 2019 17:04)  HR: 86 (28 Jun 2019 17:06) (66 - 92)  BP: 105/59 (28 Jun 2019 17:06) (105/59 - 105/59)  BP(mean): 71 (28 Jun 2019 17:06) (71 - 71)  RR: 12 (28 Jun 2019 17:06) (12 - 31)  SpO2: 99% (28 Jun 2019 17:06) (96% - 100%)  I&O's Detail    27 Jun 2019 07:01  -  28 Jun 2019 07:00  --------------------------------------------------------  IN:    Albumin 5%  - 250 mL: 750 mL    dexmedetomidine Infusion: 76 mL    insulin regular Infusion: 15 mL    IV PiggyBack: 300 mL    propofol Infusion: 45 mL    sodium chloride 0.9%.: 160 mL  Total IN: 1346 mL    OUT:    Chest Tube: 68 mL    Chest Tube: 420 mL    Indwelling Catheter - Urethral: 3005 mL  Total OUT: 3493 mL    Total NET: -2147 mL      28 Jun 2019 07:01  -  28 Jun 2019 18:07  --------------------------------------------------------  IN:    IV PiggyBack: 500 mL    sodium chloride 0.9%.: 70 mL  Total IN: 570 mL    OUT:    Chest Tube: 92 mL    Chest Tube: 30 mL    Indwelling Catheter - Urethral: 720 mL  Total OUT: 842 mL    Total NET: -272 mL      CHEST TUBE:  Yes. AIR LEAKS: No. Suction   AMARILIS DRAIN:  No.  EPICARDIAL WIRES: Yes  TIE DOWNS: Yes  PABLO: No.    PHYSICAL EXAM:    General: NAD, laying in bed  Neurological: moving all extremities with no focal deficits, A&Ox3  Cardiovascular: RRR, no m/r/g  Respiratory: poor inspiratory effort, quite breath sounds no w/r/r  Gastrointestinal: NT-ND, soft  Extremities: warm and well perfused, no edema or calf tenderness b/l   Vascular: +2 DP bL   Incision Sites: sternotomy mepelex dressing CDI, Left radial harvest CDI, mild localized ecchymosis no drianage    LABS:                        10.6   10.47 )-----------( 157      ( 28 Jun 2019 13:17 )             31.9       COUMADIN:  No.     PT/INR - ( 28 Jun 2019 13:17 )   PT: 14.9 sec;   INR: 1.31      PTT - ( 28 Jun 2019 13:17 )  PTT:28.0 sec    06-28    139  |  104  |  14  ----------------------------<  140<H>  4.0   |  26  |  0.84    Ca    9.0      28 Jun 2019 13:17  Phos  2.2     06-28  Mg     2.0     06-28    TPro  6.0  /  Alb  4.2  /  TBili  0.9  /  DBili  x   /  AST  81<H>  /  ALT  32  /  AlkPhos  45  06-28      MEDICATIONS  (STANDING):  aspirin enteric coated 81 milliGRAM(s) Oral daily  atorvastatin 80 milliGRAM(s) Oral at bedtime  ceFAZolin  Injectable. 2000 milliGRAM(s) IV Push every 8 hours  clopidogrel Tablet 75 milliGRAM(s) Oral daily  dextrose 5%. 1000 milliLiter(s) (50 mL/Hr) IV Continuous <Continuous>  dextrose 50% Injectable 50 milliLiter(s) IV Push every 15 minutes  dextrose 50% Injectable 25 milliLiter(s) IV Push every 15 minutes  docusate sodium 100 milliGRAM(s) Oral three times a day  heparin  Injectable 5000 Unit(s) SubCutaneous every 8 hours  insulin lispro (HumaLOG) corrective regimen sliding scale   SubCutaneous Before meals and at bedtime  metoclopramide Injectable 10 milliGRAM(s) IV Push once  pantoprazole    Tablet 40 milliGRAM(s) Oral before breakfast  senna 1 Tablet(s) Oral at bedtime  sodium chloride 0.9% lock flush 3 milliLiter(s) IV Push every 8 hours  sodium chloride 0.9%. 1000 milliLiter(s) (10 mL/Hr) IV Continuous <Continuous>  vortioxetine 10 milliGRAM(s) Oral daily    MEDICATIONS  (PRN):  dextrose 40% Gel 15 Gram(s) Oral once PRN Blood Glucose LESS THAN 70 milliGRAM(s)/deciliter  glucagon  Injectable 1 milliGRAM(s) IntraMuscular once PRN Glucose LESS THAN 70 milligrams/deciliter  oxyCODONE    5 mG/acetaminophen 325 mG 1 Tablet(s) Oral every 6 hours PRN Moderate Pain (4 - 6)  oxyCODONE    5 mG/acetaminophen 325 mG 2 Tablet(s) Oral every 6 hours PRN Severe Pain (7 - 10)  simethicone 80 milliGRAM(s) Chew three times a day PRN Gas        RADIOLOGY & ADDITIONAL TESTS:  CXR: < from: Xray Chest 1 View- PORTABLE-Urgent (06.28.19 @ 14:11) >  IMPRESSION:  Improved bibasilar atelectasis and small right pleural effusion. Small   right apical pneumothorax, unchanged.    < end of copied text >

## 2019-06-29 LAB
ANION GAP SERPL CALC-SCNC: 10 MMOL/L — SIGNIFICANT CHANGE UP (ref 5–17)
BASOPHILS # BLD AUTO: 0.03 K/UL — SIGNIFICANT CHANGE UP (ref 0–0.2)
BASOPHILS NFR BLD AUTO: 0.3 % — SIGNIFICANT CHANGE UP (ref 0–2)
BUN SERPL-MCNC: 16 MG/DL — SIGNIFICANT CHANGE UP (ref 7–23)
CALCIUM SERPL-MCNC: 9 MG/DL — SIGNIFICANT CHANGE UP (ref 8.4–10.5)
CHLORIDE SERPL-SCNC: 105 MMOL/L — SIGNIFICANT CHANGE UP (ref 96–108)
CO2 SERPL-SCNC: 25 MMOL/L — SIGNIFICANT CHANGE UP (ref 22–31)
CREAT SERPL-MCNC: 0.84 MG/DL — SIGNIFICANT CHANGE UP (ref 0.5–1.3)
EOSINOPHIL # BLD AUTO: 0.05 K/UL — SIGNIFICANT CHANGE UP (ref 0–0.5)
EOSINOPHIL NFR BLD AUTO: 0.4 % — SIGNIFICANT CHANGE UP (ref 0–6)
GLUCOSE SERPL-MCNC: 112 MG/DL — HIGH (ref 70–99)
HCT VFR BLD CALC: 34.3 % — LOW (ref 39–50)
HGB BLD-MCNC: 11.1 G/DL — LOW (ref 13–17)
IMM GRANULOCYTES NFR BLD AUTO: 0.6 % — SIGNIFICANT CHANGE UP (ref 0–1.5)
LYMPHOCYTES # BLD AUTO: 1.09 K/UL — SIGNIFICANT CHANGE UP (ref 1–3.3)
LYMPHOCYTES # BLD AUTO: 9.7 % — LOW (ref 13–44)
MAGNESIUM SERPL-MCNC: 1.9 MG/DL — SIGNIFICANT CHANGE UP (ref 1.6–2.6)
MCHC RBC-ENTMCNC: 30.2 PG — SIGNIFICANT CHANGE UP (ref 27–34)
MCHC RBC-ENTMCNC: 32.4 GM/DL — SIGNIFICANT CHANGE UP (ref 32–36)
MCV RBC AUTO: 93.2 FL — SIGNIFICANT CHANGE UP (ref 80–100)
MONOCYTES # BLD AUTO: 1.14 K/UL — HIGH (ref 0–0.9)
MONOCYTES NFR BLD AUTO: 10.1 % — SIGNIFICANT CHANGE UP (ref 2–14)
NEUTROPHILS # BLD AUTO: 8.9 K/UL — HIGH (ref 1.8–7.4)
NEUTROPHILS NFR BLD AUTO: 78.9 % — HIGH (ref 43–77)
NRBC # BLD: 0 /100 WBCS — SIGNIFICANT CHANGE UP (ref 0–0)
PHOSPHATE SERPL-MCNC: 1.7 MG/DL — LOW (ref 2.5–4.5)
PLATELET # BLD AUTO: 156 K/UL — SIGNIFICANT CHANGE UP (ref 150–400)
POTASSIUM SERPL-MCNC: 3.9 MMOL/L — SIGNIFICANT CHANGE UP (ref 3.5–5.3)
POTASSIUM SERPL-SCNC: 3.9 MMOL/L — SIGNIFICANT CHANGE UP (ref 3.5–5.3)
RBC # BLD: 3.68 M/UL — LOW (ref 4.2–5.8)
RBC # FLD: 12.9 % — SIGNIFICANT CHANGE UP (ref 10.3–14.5)
SODIUM SERPL-SCNC: 140 MMOL/L — SIGNIFICANT CHANGE UP (ref 135–145)
WBC # BLD: 11.28 K/UL — HIGH (ref 3.8–10.5)
WBC # FLD AUTO: 11.28 K/UL — HIGH (ref 3.8–10.5)

## 2019-06-29 PROCEDURE — 71045 X-RAY EXAM CHEST 1 VIEW: CPT | Mod: 26

## 2019-06-29 RX ORDER — AMLODIPINE BESYLATE 2.5 MG/1
2.5 TABLET ORAL DAILY
Refills: 0 | Status: DISCONTINUED | OUTPATIENT
Start: 2019-06-29 | End: 2019-06-29

## 2019-06-29 RX ORDER — KETOROLAC TROMETHAMINE 30 MG/ML
30 SYRINGE (ML) INJECTION EVERY 6 HOURS
Refills: 0 | Status: DISCONTINUED | OUTPATIENT
Start: 2019-06-29 | End: 2019-07-01

## 2019-06-29 RX ORDER — KETOROLAC TROMETHAMINE 30 MG/ML
30 SYRINGE (ML) INJECTION ONCE
Refills: 0 | Status: DISCONTINUED | OUTPATIENT
Start: 2019-06-29 | End: 2019-06-29

## 2019-06-29 RX ORDER — AMLODIPINE BESYLATE 2.5 MG/1
2.5 TABLET ORAL
Refills: 0 | Status: DISCONTINUED | OUTPATIENT
Start: 2019-06-29 | End: 2019-06-30

## 2019-06-29 RX ORDER — ACETAMINOPHEN 500 MG
650 TABLET ORAL EVERY 6 HOURS
Refills: 0 | Status: DISCONTINUED | OUTPATIENT
Start: 2019-06-29 | End: 2019-07-01

## 2019-06-29 RX ORDER — METOPROLOL TARTRATE 50 MG
12.5 TABLET ORAL
Refills: 0 | Status: DISCONTINUED | OUTPATIENT
Start: 2019-06-29 | End: 2019-06-30

## 2019-06-29 RX ADMIN — AMLODIPINE BESYLATE 2.5 MILLIGRAM(S): 2.5 TABLET ORAL at 11:47

## 2019-06-29 RX ADMIN — ATORVASTATIN CALCIUM 80 MILLIGRAM(S): 80 TABLET, FILM COATED ORAL at 21:36

## 2019-06-29 RX ADMIN — CLOPIDOGREL BISULFATE 75 MILLIGRAM(S): 75 TABLET, FILM COATED ORAL at 11:47

## 2019-06-29 RX ADMIN — PANTOPRAZOLE SODIUM 40 MILLIGRAM(S): 20 TABLET, DELAYED RELEASE ORAL at 07:58

## 2019-06-29 RX ADMIN — SENNA PLUS 1 TABLET(S): 8.6 TABLET ORAL at 21:35

## 2019-06-29 RX ADMIN — Medication 100 MILLIGRAM(S): at 21:36

## 2019-06-29 RX ADMIN — Medication 100 MILLIGRAM(S): at 05:34

## 2019-06-29 RX ADMIN — SODIUM CHLORIDE 3 MILLILITER(S): 9 INJECTION INTRAMUSCULAR; INTRAVENOUS; SUBCUTANEOUS at 14:07

## 2019-06-29 RX ADMIN — Medication 12.5 MILLIGRAM(S): at 19:20

## 2019-06-29 RX ADMIN — Medication 30 MILLIGRAM(S): at 18:11

## 2019-06-29 RX ADMIN — OXYCODONE AND ACETAMINOPHEN 1 TABLET(S): 5; 325 TABLET ORAL at 11:30

## 2019-06-29 RX ADMIN — HEPARIN SODIUM 5000 UNIT(S): 5000 INJECTION INTRAVENOUS; SUBCUTANEOUS at 14:07

## 2019-06-29 RX ADMIN — Medication 100 MILLIGRAM(S): at 14:07

## 2019-06-29 RX ADMIN — HEPARIN SODIUM 5000 UNIT(S): 5000 INJECTION INTRAVENOUS; SUBCUTANEOUS at 21:35

## 2019-06-29 RX ADMIN — Medication 30 MILLIGRAM(S): at 05:45

## 2019-06-29 RX ADMIN — SODIUM CHLORIDE 3 MILLILITER(S): 9 INJECTION INTRAMUSCULAR; INTRAVENOUS; SUBCUTANEOUS at 22:00

## 2019-06-29 RX ADMIN — VORTIOXETINE 10 MILLIGRAM(S): 5 TABLET, FILM COATED ORAL at 11:50

## 2019-06-29 RX ADMIN — Medication 2000 MILLIGRAM(S): at 05:34

## 2019-06-29 RX ADMIN — Medication 30 MILLIGRAM(S): at 05:30

## 2019-06-29 RX ADMIN — HEPARIN SODIUM 5000 UNIT(S): 5000 INJECTION INTRAVENOUS; SUBCUTANEOUS at 05:34

## 2019-06-29 RX ADMIN — Medication 81 MILLIGRAM(S): at 11:47

## 2019-06-29 RX ADMIN — OXYCODONE AND ACETAMINOPHEN 1 TABLET(S): 5; 325 TABLET ORAL at 11:03

## 2019-06-29 RX ADMIN — SODIUM CHLORIDE 3 MILLILITER(S): 9 INJECTION INTRAMUSCULAR; INTRAVENOUS; SUBCUTANEOUS at 05:20

## 2019-06-29 NOTE — PROGRESS NOTE ADULT - SUBJECTIVE AND OBJECTIVE BOX
Patient discussed on morning rounds with Dr. Rushing    Operation / Date:  6/27/19 OPCABx5 (LIMA-D1 sequential to LAD, FRANCA-ramus, radial-LPA sequential to PDA).  EF 60%     SUBJECTIVE ASSESSMENT:  Patient feels well today.  He  Denies CP, SOB, dizziness, N/V/D, fever or chills.     Vital Signs Last 24 Hrs  T(C): 36.9 (29 Jun 2019 09:13), Max: 37.7 (28 Jun 2019 17:04)  T(F): 98.5 (29 Jun 2019 09:13), Max: 99.9 (28 Jun 2019 17:04)  HR: 92 (29 Jun 2019 09:13) (74 - 92)  BP: 118/65 (29 Jun 2019 09:13) (98/61 - 118/65)  BP(mean): 81 (29 Jun 2019 09:13) (71 - 86)  RR: 22 (29 Jun 2019 09:13) (12 - 25)  SpO2: 99% (29 Jun 2019 09:13) (86% - 99%)  I&O's Detail    28 Jun 2019 07:01  -  29 Jun 2019 07:00  --------------------------------------------------------  IN:    IV PiggyBack: 500 mL    Oral Fluid: 100 mL    sodium chloride 0.9%.: 70 mL  Total IN: 670 mL    OUT:    Chest Tube: 170 mL    Chest Tube: 92 mL    Indwelling Catheter - Urethral: 720 mL    Voided: 400 mL  Total OUT: 1382 mL    Total NET: -712 mL      29 Jun 2019 07:01  -  29 Jun 2019 13:02  --------------------------------------------------------  IN:    Oral Fluid: 200 mL  Total IN: 200 mL    OUT:    Chest Tube: 35 mL    Voided: 300 mL  Total OUT: 335 mL    Total NET: -135 mL          CHEST TUBE:  Yes mediastinal;  AIR LEAKS: No. Suction   AMARILIS DRAIN:  No  EPICARDIAL WIRES: Yes/No.  TIE DOWNS: Yes/No.  PABLO: No    PHYSICAL EXAM:    GEN: NAD, looks comfortable.  Sitting up in the chair, smiling.  Cooperative.   Psych: Mood appropriate  Neuro: A&Ox3.  No focal deficits.  Moving all extremities.   HEENT: No obvious abnormalities  CV: S1S2, regular, no murmurs appreciated.  No carotid bruits.  No JVD  Lungs: Clear B/L.  No wheezing, rales or rhonchi  ABD: Soft, non-tender, non-distended.  +Bowel sounds  EXT: Warm and well perfused.  No peripheral edema noted  Musculoskeletal: Moving all extremities with normal ROM, no joint swelling  PV: Pedal pulses palpable  Incision Sites: MSI dressed with Mepilex.  To stay intact until tomorrow.     LABS:                        11.1   11.28 )-----------( 156      ( 29 Jun 2019 09:12 )             34.3       COUMADIN:  No    PT/INR - ( 28 Jun 2019 13:17 )   PT: 14.9 sec;   INR: 1.31          PTT - ( 28 Jun 2019 13:17 )  PTT:28.0 sec    06-29    140  |  105  |  16  ----------------------------<  112<H>  3.9   |  25  |  0.84    Ca    9.0      29 Jun 2019 09:12  Phos  1.7     06-29  Mg     1.9     06-29    TPro  6.0  /  Alb  4.2  /  TBili  0.9  /  DBili  x   /  AST  81<H>  /  ALT  32  /  AlkPhos  45  06-28          MEDICATIONS  (STANDING):  amLODIPine   Tablet 2.5 milliGRAM(s) Oral daily  aspirin enteric coated 81 milliGRAM(s) Oral daily  atorvastatin 80 milliGRAM(s) Oral at bedtime  clopidogrel Tablet 75 milliGRAM(s) Oral daily  docusate sodium 100 milliGRAM(s) Oral three times a day  heparin  Injectable 5000 Unit(s) SubCutaneous every 8 hours  pantoprazole    Tablet 40 milliGRAM(s) Oral before breakfast  senna 1 Tablet(s) Oral at bedtime  sodium chloride 0.9% lock flush 3 milliLiter(s) IV Push every 8 hours  sodium chloride 0.9%. 1000 milliLiter(s) (10 mL/Hr) IV Continuous <Continuous>  vortioxetine 10 milliGRAM(s) Oral daily    MEDICATIONS  (PRN):  oxyCODONE    5 mG/acetaminophen 325 mG 1 Tablet(s) Oral every 6 hours PRN Moderate Pain (4 - 6)  oxyCODONE    5 mG/acetaminophen 325 mG 2 Tablet(s) Oral every 6 hours PRN Severe Pain (7 - 10)  polyethylene glycol 3350 17 Gram(s) Oral daily PRN Constipation  simethicone 80 milliGRAM(s) Chew three times a day PRN Gas        RADIOLOGY & ADDITIONAL TESTS:    < from: Xray Chest 1 View- PORTABLE-Urgent (06.29.19 @ 08:24) >    Impression: Improvement right effusion. Small left effusion cannot be   excluded. Discoid change right lung base    < end of copied text > Patient discussed on morning rounds with Dr. Rushing    Operation / Date:  6/27/19 OPCABx5 (LIMA-D1 sequential to LAD, FRANCA-ramus, radial-LPA sequential to PDA).  EF 60%     SUBJECTIVE ASSESSMENT:  Patient feels well today.  Yesterday he was having a lot more pain, with any movement.  But today, he feels much better, and his pain is controlled on his current pain regimen.  He walked only once today so far, but is using his IS and "getting better".  Currently pulls 1L (witnessed).  He is tolerating PO diet, has not had a BM yet but is passing gas.  He states he will walk again this afternoon.  Denies CP, SOB, dizziness, N/V/D, fever or chills.     Vital Signs Last 24 Hrs  T(C): 36.9 (29 Jun 2019 09:13), Max: 37.7 (28 Jun 2019 17:04)  T(F): 98.5 (29 Jun 2019 09:13), Max: 99.9 (28 Jun 2019 17:04)  HR: 92 (29 Jun 2019 09:13) (74 - 92)  BP: 118/65 (29 Jun 2019 09:13) (98/61 - 118/65)  BP(mean): 81 (29 Jun 2019 09:13) (71 - 86)  RR: 22 (29 Jun 2019 09:13) (12 - 25)  SpO2: 99% (29 Jun 2019 09:13) (86% - 99%)  I&O's Detail    28 Jun 2019 07:01  -  29 Jun 2019 07:00  --------------------------------------------------------  IN:    IV PiggyBack: 500 mL    Oral Fluid: 100 mL    sodium chloride 0.9%.: 70 mL  Total IN: 670 mL    OUT:    Chest Tube: 170 mL    Chest Tube: 92 mL    Indwelling Catheter - Urethral: 720 mL    Voided: 400 mL  Total OUT: 1382 mL    Total NET: -712 mL      29 Jun 2019 07:01  -  29 Jun 2019 13:02  --------------------------------------------------------  IN:    Oral Fluid: 200 mL  Total IN: 200 mL    OUT:    Chest Tube: 35 mL    Voided: 300 mL  Total OUT: 335 mL    Total NET: -135 mL          CHEST TUBE:  Yes mediastinal;  AIR LEAKS: No. Suction --> Just removed ~3pm.  Patient tolerated well.  Occlusive dressing applied.  CXR pending  AMARILIS DRAIN:  No  EPICARDIAL WIRES: Yes  TIE DOWNS: Yes 2  PABLO: No    PHYSICAL EXAM:    GEN: NAD, looks comfortable.  Sitting up in the chair, smiling.  Cooperative.   Psych: Mood appropriate  Neuro: A&Ox3.  No focal deficits.  Moving all extremities.   HEENT: No obvious abnormalities  CV: S1S2, regular, no murmurs appreciated.  No carotid bruits.  No JVD  Lungs: Clear B/L.  No wheezing, rales or rhonchi  ABD: Soft, non-tender, non-distended.  +Bowel sounds  EXT: Warm and well perfused.  No peripheral edema noted  Musculoskeletal: Moving all extremities with normal ROM, no joint swelling  PV: Pedal pulses palpable  Incision Sites: MSI dressed with Mepilex.  To stay intact until tomorrow.     LABS:                        11.1   11.28 )-----------( 156      ( 29 Jun 2019 09:12 )             34.3       COUMADIN:  No    PT/INR - ( 28 Jun 2019 13:17 )   PT: 14.9 sec;   INR: 1.31          PTT - ( 28 Jun 2019 13:17 )  PTT:28.0 sec    06-29    140  |  105  |  16  ----------------------------<  112<H>  3.9   |  25  |  0.84    Ca    9.0      29 Jun 2019 09:12  Phos  1.7     06-29  Mg     1.9     06-29    TPro  6.0  /  Alb  4.2  /  TBili  0.9  /  DBili  x   /  AST  81<H>  /  ALT  32  /  AlkPhos  45  06-28          MEDICATIONS  (STANDING):  amLODIPine   Tablet 2.5 milliGRAM(s) Oral daily  aspirin enteric coated 81 milliGRAM(s) Oral daily  atorvastatin 80 milliGRAM(s) Oral at bedtime  clopidogrel Tablet 75 milliGRAM(s) Oral daily  docusate sodium 100 milliGRAM(s) Oral three times a day  heparin  Injectable 5000 Unit(s) SubCutaneous every 8 hours  pantoprazole    Tablet 40 milliGRAM(s) Oral before breakfast  senna 1 Tablet(s) Oral at bedtime  sodium chloride 0.9% lock flush 3 milliLiter(s) IV Push every 8 hours  sodium chloride 0.9%. 1000 milliLiter(s) (10 mL/Hr) IV Continuous <Continuous>  vortioxetine 10 milliGRAM(s) Oral daily    MEDICATIONS  (PRN):  oxyCODONE    5 mG/acetaminophen 325 mG 1 Tablet(s) Oral every 6 hours PRN Moderate Pain (4 - 6)  oxyCODONE    5 mG/acetaminophen 325 mG 2 Tablet(s) Oral every 6 hours PRN Severe Pain (7 - 10)  polyethylene glycol 3350 17 Gram(s) Oral daily PRN Constipation  simethicone 80 milliGRAM(s) Chew three times a day PRN Gas        RADIOLOGY & ADDITIONAL TESTS:    < from: Xray Chest 1 View- PORTABLE-Urgent (06.29.19 @ 08:24) >    Impression: Improvement right effusion. Small left effusion cannot be   excluded. Discoid change right lung base    < end of copied text >

## 2019-06-29 NOTE — PROGRESS NOTE ADULT - ASSESSMENT
This is a 59 y/o male, former drug use (cocaine, ecstacy), sober x 25 years, with FHx of CAD and PMHx of RBBB, HTN, prediabetes and ADHD, previously on Truvada as PrEP, who presented to [facility] ED on 6/22/19 c/o acute onset CP and fatigue.  He was ruled out for PE, but ruled in for NSTEMI. Cardiac cath revealed 3VCAD.  On 6/27/19, pt underwent an uncomplicated OPCABx5. On POD#0 he was extubated. POD#1, B/L pleural CTs removed, with subsequent small right apical pneumothorax.  Mediastinal CT kept to suction. Transferred to the floor yesterday. Not on beta blocker yet due to borderline BP.  Today, BP remains stable, so we initiated Norvasc per Dr. Boland (due to having all radial grafts).  His mediastinal tube apparently had an air leak overnight, but this am looks stable w/o leak.  CXR this am stable, reviewed wtih Dr. Rushing.       A/P:  Neurovascular: No delirium. Pain well controlled with current regimen.  -continue home Trintellix for depression  - continue PRN percocet and tylenol    Cardiovascular: Hemodynamically stable. HR controlled. POD#1 OPCAB   -continue to monitor BP/HR/Tele   - continue lipitor 80mg PO QD, and ASA/Plavix   - arterial harvest start CCB when BP tolerates, then may start BB as per Dr. Boland    Respiratory: 02 Sat = 99% on 4LNC. small right apical PTX s/p CT removal  - continue medistinal CT on suction, f/u PM CXR   -If on oxygen wean to RA from for O2 Sat > 93%.  -Encourage C+DB and Use of IS 10x / hr while awake.  -CXR- f/u tomorrow AM PA/Lat CXR     GI: c/o gas pain- given reglan and simethacone  -PPX- protonix  -PO Diet.    Renal / : continue to trend daily labs BUN/Cr: 14/0.84  -Monitor renal function.  -Monitor I/O's.    Endocrine:  no h/o DM or thyroid disease   -A1c- 5.8  -TSH- 0.7    Hematologic: continue to trend daily labs H/H 10.6/31.9    ID: no acute issues   -Temp- afebrile  -CBC- 10  -Observe for SIRS/Sepsis Syndrome.    Prophylaxis:  -DVT prophylaxis with 5000 SubQ Heparin q8h.  -SCD's    Disposition:  -transfer to  from  This is a 57 y/o male, former drug use (cocaine, ecstacy), sober x 25 years, with FHx of CAD and PMHx of RBBB, HTN, prediabetes and ADHD, previously on Truvada as PrEP, who presented to [facility] ED on 6/22/19 c/o acute onset CP and fatigue.  He was ruled out for PE, but ruled in for NSTEMI. Cardiac cath revealed 3VCAD.  On 6/27/19, pt underwent an uncomplicated OPCABx5. On POD#0 he was extubated. POD#1, B/L pleural CTs removed, with subsequent small right apical pneumothorax.  Mediastinal CT kept to suction. Transferred to the floor yesterday. Not on beta blocker yet due to borderline BP.  Today, BP remains stable, so we initiated Norvasc per Dr. Boland (due to having all radial grafts).  His mediastinal tube apparently had an air leak overnight, but this am looks stable w/o leak.  CXR this am stable, reviewed wtih Dr. Rushing.       A/P:  Neurovascular: No delirium. Pain well controlled with current regimen.  -continue home Trintellix for depression  - continue PRN percocet and tylenol    Cardiovascular: Hemodynamically stable. HR controlled.  BP now better around 115 systolic.  Started Norvasc low dose this am as discussed wtih Dr. Rushing (due to having all radial grafts).  Will introduce beta blocker when BP tolerates, hopefully later this evening.  Continue w/ ASA/Plavix/stating.  Keep pacing wires in for now.      Respiratory: Good O2 saturation on room air.  Questionable air leak yesterday/over night, but no air leak this am and CXR stable w/o pneumothorax.  Just removed CT, CXR ordered.    Encouraged more OOB and continued IS use.    -CXR PA/Lat tomorrow.     GI: Gas pain yesterday, now improved.  Still no BM yet, but passing gas.  On bowel regimen.  PO diet, tolerating well.     Renal / : BUN/creatinine normal.  Making good urine.     Hematologic: H/H stable, on SC heparin for DVT prophylaxis.     ID: No issues.      Prophylaxis:  -DVT prophylaxis with 5000 SubQ Heparin q8h.  -SCD's    Disposition:  -Home pending medical clearance.  Likely Monday.

## 2019-06-30 LAB
ANION GAP SERPL CALC-SCNC: 11 MMOL/L — SIGNIFICANT CHANGE UP (ref 5–17)
BUN SERPL-MCNC: 16 MG/DL — SIGNIFICANT CHANGE UP (ref 7–23)
CALCIUM SERPL-MCNC: 8.5 MG/DL — SIGNIFICANT CHANGE UP (ref 8.4–10.5)
CHLORIDE SERPL-SCNC: 105 MMOL/L — SIGNIFICANT CHANGE UP (ref 96–108)
CO2 SERPL-SCNC: 24 MMOL/L — SIGNIFICANT CHANGE UP (ref 22–31)
CREAT SERPL-MCNC: 0.81 MG/DL — SIGNIFICANT CHANGE UP (ref 0.5–1.3)
GLUCOSE SERPL-MCNC: 103 MG/DL — HIGH (ref 70–99)
HCT VFR BLD CALC: 29.8 % — LOW (ref 39–50)
HGB BLD-MCNC: 10 G/DL — LOW (ref 13–17)
MAGNESIUM SERPL-MCNC: 1.8 MG/DL — SIGNIFICANT CHANGE UP (ref 1.6–2.6)
MCHC RBC-ENTMCNC: 30.6 PG — SIGNIFICANT CHANGE UP (ref 27–34)
MCHC RBC-ENTMCNC: 33.6 GM/DL — SIGNIFICANT CHANGE UP (ref 32–36)
MCV RBC AUTO: 91.1 FL — SIGNIFICANT CHANGE UP (ref 80–100)
NRBC # BLD: 0 /100 WBCS — SIGNIFICANT CHANGE UP (ref 0–0)
PHOSPHATE SERPL-MCNC: 2.1 MG/DL — LOW (ref 2.5–4.5)
PLATELET # BLD AUTO: 143 K/UL — LOW (ref 150–400)
POTASSIUM SERPL-MCNC: 3.7 MMOL/L — SIGNIFICANT CHANGE UP (ref 3.5–5.3)
POTASSIUM SERPL-SCNC: 3.7 MMOL/L — SIGNIFICANT CHANGE UP (ref 3.5–5.3)
RBC # BLD: 3.27 M/UL — LOW (ref 4.2–5.8)
RBC # FLD: 12.4 % — SIGNIFICANT CHANGE UP (ref 10.3–14.5)
SODIUM SERPL-SCNC: 140 MMOL/L — SIGNIFICANT CHANGE UP (ref 135–145)
WBC # BLD: 8.43 K/UL — SIGNIFICANT CHANGE UP (ref 3.8–10.5)
WBC # FLD AUTO: 8.43 K/UL — SIGNIFICANT CHANGE UP (ref 3.8–10.5)

## 2019-06-30 PROCEDURE — 71046 X-RAY EXAM CHEST 2 VIEWS: CPT | Mod: 26

## 2019-06-30 PROCEDURE — 99231 SBSQ HOSP IP/OBS SF/LOW 25: CPT

## 2019-06-30 RX ORDER — LACTULOSE 10 G/15ML
20 SOLUTION ORAL ONCE
Refills: 0 | Status: COMPLETED | OUTPATIENT
Start: 2019-06-30 | End: 2019-06-30

## 2019-06-30 RX ORDER — AMLODIPINE BESYLATE 2.5 MG/1
2.5 TABLET ORAL
Refills: 0 | Status: DISCONTINUED | OUTPATIENT
Start: 2019-07-01 | End: 2019-07-01

## 2019-06-30 RX ORDER — ONDANSETRON 8 MG/1
4 TABLET, FILM COATED ORAL ONCE
Refills: 0 | Status: COMPLETED | OUTPATIENT
Start: 2019-06-30 | End: 2019-06-30

## 2019-06-30 RX ORDER — METOPROLOL TARTRATE 50 MG
12.5 TABLET ORAL
Refills: 0 | Status: DISCONTINUED | OUTPATIENT
Start: 2019-06-30 | End: 2019-07-01

## 2019-06-30 RX ORDER — AMLODIPINE BESYLATE 2.5 MG/1
2.5 TABLET ORAL ONCE
Refills: 0 | Status: COMPLETED | OUTPATIENT
Start: 2019-06-30 | End: 2019-06-30

## 2019-06-30 RX ORDER — POTASSIUM CHLORIDE 20 MEQ
40 PACKET (EA) ORAL ONCE
Refills: 0 | Status: COMPLETED | OUTPATIENT
Start: 2019-06-30 | End: 2019-06-30

## 2019-06-30 RX ORDER — SODIUM,POTASSIUM PHOSPHATES 278-250MG
2 POWDER IN PACKET (EA) ORAL
Refills: 0 | Status: COMPLETED | OUTPATIENT
Start: 2019-06-30 | End: 2019-07-01

## 2019-06-30 RX ORDER — MAGNESIUM OXIDE 400 MG ORAL TABLET 241.3 MG
800 TABLET ORAL EVERY 4 HOURS
Refills: 0 | Status: COMPLETED | OUTPATIENT
Start: 2019-06-30 | End: 2019-06-30

## 2019-06-30 RX ADMIN — PANTOPRAZOLE SODIUM 40 MILLIGRAM(S): 20 TABLET, DELAYED RELEASE ORAL at 06:07

## 2019-06-30 RX ADMIN — OXYCODONE AND ACETAMINOPHEN 1 TABLET(S): 5; 325 TABLET ORAL at 06:06

## 2019-06-30 RX ADMIN — LACTULOSE 20 GRAM(S): 10 SOLUTION ORAL at 15:01

## 2019-06-30 RX ADMIN — HEPARIN SODIUM 5000 UNIT(S): 5000 INJECTION INTRAVENOUS; SUBCUTANEOUS at 06:06

## 2019-06-30 RX ADMIN — Medication 2 TABLET(S): at 23:49

## 2019-06-30 RX ADMIN — OXYCODONE AND ACETAMINOPHEN 1 TABLET(S): 5; 325 TABLET ORAL at 07:06

## 2019-06-30 RX ADMIN — HEPARIN SODIUM 5000 UNIT(S): 5000 INJECTION INTRAVENOUS; SUBCUTANEOUS at 23:47

## 2019-06-30 RX ADMIN — SODIUM CHLORIDE 3 MILLILITER(S): 9 INJECTION INTRAMUSCULAR; INTRAVENOUS; SUBCUTANEOUS at 21:45

## 2019-06-30 RX ADMIN — HEPARIN SODIUM 5000 UNIT(S): 5000 INJECTION INTRAVENOUS; SUBCUTANEOUS at 15:02

## 2019-06-30 RX ADMIN — MAGNESIUM OXIDE 400 MG ORAL TABLET 800 MILLIGRAM(S): 241.3 TABLET ORAL at 11:37

## 2019-06-30 RX ADMIN — Medication 30 MILLIGRAM(S): at 18:37

## 2019-06-30 RX ADMIN — Medication 12.5 MILLIGRAM(S): at 23:47

## 2019-06-30 RX ADMIN — Medication 10 MILLIGRAM(S): at 18:38

## 2019-06-30 RX ADMIN — Medication 100 MILLIGRAM(S): at 06:03

## 2019-06-30 RX ADMIN — SODIUM CHLORIDE 3 MILLILITER(S): 9 INJECTION INTRAMUSCULAR; INTRAVENOUS; SUBCUTANEOUS at 14:39

## 2019-06-30 RX ADMIN — MAGNESIUM OXIDE 400 MG ORAL TABLET 800 MILLIGRAM(S): 241.3 TABLET ORAL at 15:02

## 2019-06-30 RX ADMIN — CLOPIDOGREL BISULFATE 75 MILLIGRAM(S): 75 TABLET, FILM COATED ORAL at 11:37

## 2019-06-30 RX ADMIN — VORTIOXETINE 10 MILLIGRAM(S): 5 TABLET, FILM COATED ORAL at 11:37

## 2019-06-30 RX ADMIN — SODIUM CHLORIDE 3 MILLILITER(S): 9 INJECTION INTRAMUSCULAR; INTRAVENOUS; SUBCUTANEOUS at 05:15

## 2019-06-30 RX ADMIN — AMLODIPINE BESYLATE 2.5 MILLIGRAM(S): 2.5 TABLET ORAL at 11:37

## 2019-06-30 RX ADMIN — Medication 100 MILLIGRAM(S): at 15:02

## 2019-06-30 RX ADMIN — SENNA PLUS 1 TABLET(S): 8.6 TABLET ORAL at 23:49

## 2019-06-30 RX ADMIN — ATORVASTATIN CALCIUM 80 MILLIGRAM(S): 80 TABLET, FILM COATED ORAL at 23:50

## 2019-06-30 RX ADMIN — Medication 12.5 MILLIGRAM(S): at 06:07

## 2019-06-30 RX ADMIN — Medication 100 MILLIGRAM(S): at 23:54

## 2019-06-30 RX ADMIN — Medication 81 MILLIGRAM(S): at 11:37

## 2019-06-30 RX ADMIN — MAGNESIUM OXIDE 400 MG ORAL TABLET 800 MILLIGRAM(S): 241.3 TABLET ORAL at 23:48

## 2019-06-30 RX ADMIN — Medication 40 MILLIEQUIVALENT(S): at 11:37

## 2019-06-30 RX ADMIN — ONDANSETRON 4 MILLIGRAM(S): 8 TABLET, FILM COATED ORAL at 13:59

## 2019-06-30 NOTE — PROGRESS NOTE ADULT - ASSESSMENT
This is a 59 y/o male, former drug use (cocaine, ecstacy), sober x 25 years, with FHx of CAD and PMHx of RBBB, HTN, prediabetes and ADHD, previously on Truvada as PrEP, who presented to [facility] ED on 6/22/19 c/o acute onset CP and fatigue.  He was ruled out for PE, but ruled in for NSTEMI. Cardiac cath revealed 3VCAD.  On 6/27/19, pt underwent an uncomplicated OPCABx5. On POD#0 he was extubated. POD#1, B/L pleural CTs removed, with subsequent small right apical pneumothorax.  Mediastinal CT kept to suction. Transferred to the floor yesterday. Not on beta blocker yet due to borderline BP.  Today, BP remains stable, so we initiated Norvasc per Dr. Boland (due to having all radial grafts).  His mediastinal tube apparently had an air leak overnight, but this am looks stable w/o leak. Patient tolerated Norvasc and Metoprolol yesterday.  CXR this am with improved right apical ptx post mediastinal tube removal yesterday.    Plan  Neurovascular: no issues  No delirium.   -continue home Trintellix for depression  -continue PRN percocet and tylenol    Cardiovascular: OPCABx5 (LIMA-D1 sequential to LAD, FRANCA-ramus, radial-LPA sequential to PDA)  -Hemodynamically stable. HR controlled.   -Tolerating low dose Norvasc low dose (due to having all radial grafts).   -beta blocker to be given after Norvasc if BP allows    -Continue w/ ASA/Plavix/stating.  Keep pacing wires in for now.      Respiratory: stable R. apical ptx  Good O2 saturation on room air.   Apical ptx improved on today's cxr  Encouraged more OOB and continued IS use.    -CXR PA/Lat tomorrow.     GI: constipation  -po diet  -continue bowel regimen.   -lactulose now then if no results in 2 hrs patient will be given a suppository  -continue to ambulate    Renal / : no issues  BUN/creatinine normal.     Hematologic:  -H/H stable,       ID: No issues.      Prophylaxis:  -DVT prophylaxis with 5000 SubQ Heparin q8h.  -SCD's    Disposition:  -Home pending medical clearance.  Likely Monday.

## 2019-06-30 NOTE — PROGRESS NOTE ADULT - SUBJECTIVE AND OBJECTIVE BOX
Patient discussed on morning rounds with       Operation / Date:     SUBJECTIVE ASSESSMENT:  58y Male         Vital Signs Last 24 Hrs  T(C): 36.4 (30 Jun 2019 08:47), Max: 37.6 (29 Jun 2019 17:00)  T(F): 97.5 (30 Jun 2019 08:47), Max: 99.6 (29 Jun 2019 17:00)  HR: 86 (30 Jun 2019 08:47) (77 - 95)  BP: 91/55 (30 Jun 2019 08:47) (91/55 - 118/70)  BP(mean): 85 (30 Jun 2019 08:47) (73 - 85)  RR: 15 (30 Jun 2019 08:47) (14 - 19)  SpO2: 97% (30 Jun 2019 08:47) (91% - 97%)  I&O's Detail    29 Jun 2019 07:01  -  30 Jun 2019 07:00  --------------------------------------------------------  IN:    Oral Fluid: 500 mL  Total IN: 500 mL    OUT:    Chest Tube: 45 mL    Voided: 950 mL  Total OUT: 995 mL    Total NET: -495 mL          CHEST TUBE:  Yes/No. AIR LEAKS: Yes/No. Suction / H2O SEAL.   AMARILIS DRAIN:  Yes/No.  EPICARDIAL WIRES: Yes/No.  TIE DOWNS: Yes/No.  PABLO: Yes/No.    PHYSICAL EXAM:    General:     Neurological:    Cardiovascular:    Respiratory:    Gastrointestinal:    Extremities:    Vascular:    Incision Sites:    LABS:                        10.0   8.43  )-----------( 143      ( 30 Jun 2019 06:02 )             29.8       COUMADIN:  Yes/No. REASON: .    PT/INR - ( 28 Jun 2019 13:17 )   PT: 14.9 sec;   INR: 1.31          PTT - ( 28 Jun 2019 13:17 )  PTT:28.0 sec    06-30    140  |  105  |  16  ----------------------------<  103<H>  3.7   |  24  |  0.81    Ca    8.5      30 Jun 2019 06:02  Phos  2.1     06-30  Mg     1.8     06-30    TPro  6.0  /  Alb  4.2  /  TBili  0.9  /  DBili  x   /  AST  81<H>  /  ALT  32  /  AlkPhos  45  06-28          MEDICATIONS  (STANDING):  amLODIPine   Tablet 2.5 milliGRAM(s) Oral once  aspirin enteric coated 81 milliGRAM(s) Oral daily  atorvastatin 80 milliGRAM(s) Oral at bedtime  clopidogrel Tablet 75 milliGRAM(s) Oral daily  docusate sodium 100 milliGRAM(s) Oral three times a day  heparin  Injectable 5000 Unit(s) SubCutaneous every 8 hours  magnesium oxide 800 milliGRAM(s) Oral every 4 hours  metoprolol tartrate 12.5 milliGRAM(s) Oral <User Schedule>  pantoprazole    Tablet 40 milliGRAM(s) Oral before breakfast  potassium chloride    Tablet ER 40 milliEquivalent(s) Oral once  senna 1 Tablet(s) Oral at bedtime  sodium chloride 0.9% lock flush 3 milliLiter(s) IV Push every 8 hours  sodium chloride 0.9%. 1000 milliLiter(s) (10 mL/Hr) IV Continuous <Continuous>  vortioxetine 10 milliGRAM(s) Oral daily    MEDICATIONS  (PRN):  acetaminophen   Tablet .. 650 milliGRAM(s) Oral every 6 hours PRN Mild Pain (1 - 3)  ketorolac   Injectable 30 milliGRAM(s) IV Push every 6 hours PRN Severe Pain (7 - 10)  oxyCODONE    5 mG/acetaminophen 325 mG 1 Tablet(s) Oral every 6 hours PRN Moderate Pain (4 - 6)  oxyCODONE    5 mG/acetaminophen 325 mG 2 Tablet(s) Oral every 6 hours PRN Severe Pain (7 - 10)  polyethylene glycol 3350 17 Gram(s) Oral daily PRN Constipation  simethicone 80 milliGRAM(s) Chew three times a day PRN Gas        RADIOLOGY & ADDITIONAL TESTS: Patient discussed on morning rounds with Dr. Rushing      Operation / Date: OPCABx5 (LIMA-D1 sequential to LAD, FRANCA-ramus, radial-LPA sequential to PDA). 6/27/19    SUBJECTIVE ASSESSMENT:  58year old male who reports he has been ambulating and using the IS, but had no BM since last Wednesday.  Patient agrees with lactulose and if no response then a suppository.    Vital Signs Last 24 Hrs  T(C): 36.4 (30 Jun 2019 08:47), Max: 37.6 (29 Jun 2019 17:00)  T(F): 97.5 (30 Jun 2019 08:47), Max: 99.6 (29 Jun 2019 17:00)  HR: 86 (30 Jun 2019 08:47) (77 - 95)  BP: 91/55 (30 Jun 2019 08:47) (91/55 - 118/70)  BP(mean): 85 (30 Jun 2019 08:47) (73 - 85)  RR: 15 (30 Jun 2019 08:47) (14 - 19)  SpO2: 97% (30 Jun 2019 08:47) (91% - 97%)  I&O's Detail    29 Jun 2019 07:01  -  30 Jun 2019 07:00  --------------------------------------------------------  IN:    Oral Fluid: 500 mL  Total IN: 500 mL    OUT:    Chest Tube: 45 mL    Voided: 950 mL  Total OUT: 995 mL    Total NET: -495 mL      CHEST TUBE:  No  AMARILIS DRAIN: No.  EPICARDIAL WIRES: Yes.  TIE DOWNS: Yes  PABLO: No.    PHYSICAL EXAM:    General: Patient seen OOB in NAD  Neurological:  Alert and oriented x3, no gross deficits appreciated                   Cardiovascular:     RRR, S1S2, no murmur  Respiratory:  equal breath sounds, no rales, no rhonchi, no wheeze  Gastrointestinal:  soft, nontender, positive bowel sounds  Extremities:  no edema  Vascular:    all pulses  intact (radial, femoral, DP/PT)  Incision Sites: sternotomy stable, no click, c/d/i; chest tube sites no dehiscence, L. radial harvest incision sites c/d/i, no swelling, PW in place      LABS:                        10.0   8.43  )-----------( 143      ( 30 Jun 2019 06:02 )             29.8     COUMADIN:  No. REASON: .    PT/INR - ( 28 Jun 2019 13:17 )   PT: 14.9 sec;   INR: 1.31          PTT - ( 28 Jun 2019 13:17 )  PTT:28.0 sec    06-30    140  |  105  |  16  ----------------------------<  103<H>  3.7   |  24  |  0.81    Ca    8.5      30 Jun 2019 06:02  Phos  2.1     06-30  Mg     1.8     06-30    TPro  6.0  /  Alb  4.2  /  TBili  0.9  /  DBili  x   /  AST  81<H>  /  ALT  32  /  AlkPhos  45  06-28    MEDICATIONS  (STANDING):  amLODIPine   Tablet 2.5 milliGRAM(s) Oral once  aspirin enteric coated 81 milliGRAM(s) Oral daily  atorvastatin 80 milliGRAM(s) Oral at bedtime  clopidogrel Tablet 75 milliGRAM(s) Oral daily  docusate sodium 100 milliGRAM(s) Oral three times a day  heparin  Injectable 5000 Unit(s) SubCutaneous every 8 hours  magnesium oxide 800 milliGRAM(s) Oral every 4 hours  metoprolol tartrate 12.5 milliGRAM(s) Oral <User Schedule>  pantoprazole    Tablet 40 milliGRAM(s) Oral before breakfast  potassium chloride    Tablet ER 40 milliEquivalent(s) Oral once  senna 1 Tablet(s) Oral at bedtime  sodium chloride 0.9% lock flush 3 milliLiter(s) IV Push every 8 hours  sodium chloride 0.9%. 1000 milliLiter(s) (10 mL/Hr) IV Continuous <Continuous>  vortioxetine 10 milliGRAM(s) Oral daily    MEDICATIONS  (PRN):  acetaminophen   Tablet .. 650 milliGRAM(s) Oral every 6 hours PRN Mild Pain (1 - 3)  ketorolac   Injectable 30 milliGRAM(s) IV Push every 6 hours PRN Severe Pain (7 - 10)  oxyCODONE    5 mG/acetaminophen 325 mG 1 Tablet(s) Oral every 6 hours PRN Moderate Pain (4 - 6)  oxyCODONE    5 mG/acetaminophen 325 mG 2 Tablet(s) Oral every 6 hours PRN Severe Pain (7 - 10)  polyethylene glycol 3350 17 Gram(s) Oral daily PRN Constipation  simethicone 80 milliGRAM(s) Chew three times a day PRN Gas        RADIOLOGY & ADDITIONAL TESTS:  CXR 6/30/19, gastric distention, b/l small pleural effusion (official read pending) Patient discussed on morning rounds with Dr. Rushing      Operation / Date: OPCABx5 (LIMA-D1 sequential to LAD, FRANCA-ramus, radial-LPA sequential to PDA). 6/27/19    SUBJECTIVE ASSESSMENT:  58year old male who reports he has been ambulating and using the IS, but had no BM since last Wednesday.  Patient agrees with lactulose and if no response then a suppository.    Vital Signs Last 24 Hrs  T(C): 36.4 (30 Jun 2019 08:47), Max: 37.6 (29 Jun 2019 17:00)  T(F): 97.5 (30 Jun 2019 08:47), Max: 99.6 (29 Jun 2019 17:00)  HR: 86 (30 Jun 2019 08:47) (77 - 95)  BP: 91/55 (30 Jun 2019 08:47) (91/55 - 118/70)  BP(mean): 85 (30 Jun 2019 08:47) (73 - 85)  RR: 15 (30 Jun 2019 08:47) (14 - 19)  SpO2: 97% (30 Jun 2019 08:47) (91% - 97%)  I&O's Detail    29 Jun 2019 07:01  -  30 Jun 2019 07:00  --------------------------------------------------------  IN:    Oral Fluid: 500 mL  Total IN: 500 mL    OUT:    Chest Tube: 45 mL    Voided: 950 mL  Total OUT: 995 mL    Total NET: -495 mL      CHEST TUBE:  No  AMARILIS DRAIN: No.  EPICARDIAL WIRES: Yes.  TIE DOWNS: Yes  PABLO: No.    PHYSICAL EXAM:    General: Patient seen OOB in NAD  Neurological:  Alert and oriented x3, no gross deficits appreciated                   Cardiovascular:     RRR, S1S2, no murmur  Respiratory:  equal breath sounds, no rales, no rhonchi, no wheeze  Gastrointestinal:  soft, nontender, positive bowel sounds  Extremities:  no edema  Vascular:    all pulses  intact (right radial, left ulner, femoral, DP/PT), right radial artery harvested  Incision Sites: sternotomy stable, no click, c/d/i; chest tube sites no dehiscence, L. radial harvest incision sites c/d/i, no swelling, PW in place      LABS:                        10.0   8.43  )-----------( 143      ( 30 Jun 2019 06:02 )             29.8     COUMADIN:  No. REASON: .    PT/INR - ( 28 Jun 2019 13:17 )   PT: 14.9 sec;   INR: 1.31          PTT - ( 28 Jun 2019 13:17 )  PTT:28.0 sec    06-30    140  |  105  |  16  ----------------------------<  103<H>  3.7   |  24  |  0.81    Ca    8.5      30 Jun 2019 06:02  Phos  2.1     06-30  Mg     1.8     06-30    TPro  6.0  /  Alb  4.2  /  TBili  0.9  /  DBili  x   /  AST  81<H>  /  ALT  32  /  AlkPhos  45  06-28    MEDICATIONS  (STANDING):  amLODIPine   Tablet 2.5 milliGRAM(s) Oral once  aspirin enteric coated 81 milliGRAM(s) Oral daily  atorvastatin 80 milliGRAM(s) Oral at bedtime  clopidogrel Tablet 75 milliGRAM(s) Oral daily  docusate sodium 100 milliGRAM(s) Oral three times a day  heparin  Injectable 5000 Unit(s) SubCutaneous every 8 hours  magnesium oxide 800 milliGRAM(s) Oral every 4 hours  metoprolol tartrate 12.5 milliGRAM(s) Oral <User Schedule>  pantoprazole    Tablet 40 milliGRAM(s) Oral before breakfast  potassium chloride    Tablet ER 40 milliEquivalent(s) Oral once  senna 1 Tablet(s) Oral at bedtime  sodium chloride 0.9% lock flush 3 milliLiter(s) IV Push every 8 hours  sodium chloride 0.9%. 1000 milliLiter(s) (10 mL/Hr) IV Continuous <Continuous>  vortioxetine 10 milliGRAM(s) Oral daily    MEDICATIONS  (PRN):  acetaminophen   Tablet .. 650 milliGRAM(s) Oral every 6 hours PRN Mild Pain (1 - 3)  ketorolac   Injectable 30 milliGRAM(s) IV Push every 6 hours PRN Severe Pain (7 - 10)  oxyCODONE    5 mG/acetaminophen 325 mG 1 Tablet(s) Oral every 6 hours PRN Moderate Pain (4 - 6)  oxyCODONE    5 mG/acetaminophen 325 mG 2 Tablet(s) Oral every 6 hours PRN Severe Pain (7 - 10)  polyethylene glycol 3350 17 Gram(s) Oral daily PRN Constipation  simethicone 80 milliGRAM(s) Chew three times a day PRN Gas        RADIOLOGY & ADDITIONAL TESTS:  CXR 6/30/19, gastric distention, b/l small pleural effusion (official read pending)

## 2019-07-01 ENCOUNTER — TRANSCRIPTION ENCOUNTER (OUTPATIENT)
Age: 59
End: 2019-07-01

## 2019-07-01 VITALS
HEART RATE: 74 BPM | RESPIRATION RATE: 18 BRPM | SYSTOLIC BLOOD PRESSURE: 124 MMHG | DIASTOLIC BLOOD PRESSURE: 73 MMHG | OXYGEN SATURATION: 96 %

## 2019-07-01 LAB
ANION GAP SERPL CALC-SCNC: 11 MMOL/L — SIGNIFICANT CHANGE UP (ref 5–17)
BUN SERPL-MCNC: 10 MG/DL — SIGNIFICANT CHANGE UP (ref 7–23)
CALCIUM SERPL-MCNC: 8.8 MG/DL — SIGNIFICANT CHANGE UP (ref 8.4–10.5)
CHLORIDE SERPL-SCNC: 106 MMOL/L — SIGNIFICANT CHANGE UP (ref 96–108)
CO2 SERPL-SCNC: 25 MMOL/L — SIGNIFICANT CHANGE UP (ref 22–31)
CREAT SERPL-MCNC: 0.95 MG/DL — SIGNIFICANT CHANGE UP (ref 0.5–1.3)
GLUCOSE SERPL-MCNC: 122 MG/DL — HIGH (ref 70–99)
HCT VFR BLD CALC: 30.5 % — LOW (ref 39–50)
HGB BLD-MCNC: 9.9 G/DL — LOW (ref 13–17)
MAGNESIUM SERPL-MCNC: 2 MG/DL — SIGNIFICANT CHANGE UP (ref 1.6–2.6)
MCHC RBC-ENTMCNC: 29.6 PG — SIGNIFICANT CHANGE UP (ref 27–34)
MCHC RBC-ENTMCNC: 32.5 GM/DL — SIGNIFICANT CHANGE UP (ref 32–36)
MCV RBC AUTO: 91 FL — SIGNIFICANT CHANGE UP (ref 80–100)
NRBC # BLD: 0 /100 WBCS — SIGNIFICANT CHANGE UP (ref 0–0)
PHOSPHATE SERPL-MCNC: 2.8 MG/DL — SIGNIFICANT CHANGE UP (ref 2.5–4.5)
PLATELET # BLD AUTO: 203 K/UL — SIGNIFICANT CHANGE UP (ref 150–400)
POTASSIUM SERPL-MCNC: 3.7 MMOL/L — SIGNIFICANT CHANGE UP (ref 3.5–5.3)
POTASSIUM SERPL-SCNC: 3.7 MMOL/L — SIGNIFICANT CHANGE UP (ref 3.5–5.3)
RBC # BLD: 3.35 M/UL — LOW (ref 4.2–5.8)
RBC # FLD: 12.5 % — SIGNIFICANT CHANGE UP (ref 10.3–14.5)
SODIUM SERPL-SCNC: 142 MMOL/L — SIGNIFICANT CHANGE UP (ref 135–145)
WBC # BLD: 8.26 K/UL — SIGNIFICANT CHANGE UP (ref 3.8–10.5)
WBC # FLD AUTO: 8.26 K/UL — SIGNIFICANT CHANGE UP (ref 3.8–10.5)

## 2019-07-01 PROCEDURE — 85730 THROMBOPLASTIN TIME PARTIAL: CPT

## 2019-07-01 PROCEDURE — 84443 ASSAY THYROID STIM HORMONE: CPT

## 2019-07-01 PROCEDURE — 83880 ASSAY OF NATRIURETIC PEPTIDE: CPT

## 2019-07-01 PROCEDURE — 83605 ASSAY OF LACTIC ACID: CPT

## 2019-07-01 PROCEDURE — 93005 ELECTROCARDIOGRAM TRACING: CPT

## 2019-07-01 PROCEDURE — 83690 ASSAY OF LIPASE: CPT

## 2019-07-01 PROCEDURE — 71045 X-RAY EXAM CHEST 1 VIEW: CPT | Mod: 26,59

## 2019-07-01 PROCEDURE — 86900 BLOOD TYPING SEROLOGIC ABO: CPT

## 2019-07-01 PROCEDURE — 85027 COMPLETE CBC AUTOMATED: CPT

## 2019-07-01 PROCEDURE — 80048 BASIC METABOLIC PNL TOTAL CA: CPT

## 2019-07-01 PROCEDURE — 86850 RBC ANTIBODY SCREEN: CPT

## 2019-07-01 PROCEDURE — 82962 GLUCOSE BLOOD TEST: CPT

## 2019-07-01 PROCEDURE — C1894: CPT

## 2019-07-01 PROCEDURE — C1769: CPT

## 2019-07-01 PROCEDURE — 86923 COMPATIBILITY TEST ELECTRIC: CPT

## 2019-07-01 PROCEDURE — 83036 HEMOGLOBIN GLYCOSYLATED A1C: CPT

## 2019-07-01 PROCEDURE — 87389 HIV-1 AG W/HIV-1&-2 AB AG IA: CPT

## 2019-07-01 PROCEDURE — 82330 ASSAY OF CALCIUM: CPT

## 2019-07-01 PROCEDURE — 36415 COLL VENOUS BLD VENIPUNCTURE: CPT

## 2019-07-01 PROCEDURE — 82553 CREATINE MB FRACTION: CPT

## 2019-07-01 PROCEDURE — 84132 ASSAY OF SERUM POTASSIUM: CPT

## 2019-07-01 PROCEDURE — 93880 EXTRACRANIAL BILAT STUDY: CPT

## 2019-07-01 PROCEDURE — 93306 TTE W/DOPPLER COMPLETE: CPT

## 2019-07-01 PROCEDURE — 85025 COMPLETE CBC W/AUTO DIFF WBC: CPT

## 2019-07-01 PROCEDURE — 86901 BLOOD TYPING SEROLOGIC RH(D): CPT

## 2019-07-01 PROCEDURE — C1889: CPT

## 2019-07-01 PROCEDURE — 96374 THER/PROPH/DIAG INJ IV PUSH: CPT | Mod: XU

## 2019-07-01 PROCEDURE — P9045: CPT

## 2019-07-01 PROCEDURE — 94150 VITAL CAPACITY TEST: CPT

## 2019-07-01 PROCEDURE — 82550 ASSAY OF CK (CPK): CPT

## 2019-07-01 PROCEDURE — 71045 X-RAY EXAM CHEST 1 VIEW: CPT

## 2019-07-01 PROCEDURE — 82803 BLOOD GASES ANY COMBINATION: CPT

## 2019-07-01 PROCEDURE — 80053 COMPREHEN METABOLIC PANEL: CPT

## 2019-07-01 PROCEDURE — 96375 TX/PRO/DX INJ NEW DRUG ADDON: CPT

## 2019-07-01 PROCEDURE — 80061 LIPID PANEL: CPT

## 2019-07-01 PROCEDURE — 85379 FIBRIN DEGRADATION QUANT: CPT

## 2019-07-01 PROCEDURE — 85610 PROTHROMBIN TIME: CPT

## 2019-07-01 PROCEDURE — 81003 URINALYSIS AUTO W/O SCOPE: CPT

## 2019-07-01 PROCEDURE — 99285 EMERGENCY DEPT VISIT HI MDM: CPT | Mod: 25

## 2019-07-01 PROCEDURE — C1887: CPT

## 2019-07-01 PROCEDURE — 71046 X-RAY EXAM CHEST 2 VIEWS: CPT

## 2019-07-01 PROCEDURE — 84100 ASSAY OF PHOSPHORUS: CPT

## 2019-07-01 PROCEDURE — 83735 ASSAY OF MAGNESIUM: CPT

## 2019-07-01 PROCEDURE — 80307 DRUG TEST PRSMV CHEM ANLYZR: CPT

## 2019-07-01 PROCEDURE — 97161 PT EVAL LOW COMPLEX 20 MIN: CPT

## 2019-07-01 PROCEDURE — 71275 CT ANGIOGRAPHY CHEST: CPT

## 2019-07-01 PROCEDURE — 84295 ASSAY OF SERUM SODIUM: CPT

## 2019-07-01 PROCEDURE — 84484 ASSAY OF TROPONIN QUANT: CPT

## 2019-07-01 PROCEDURE — 71046 X-RAY EXAM CHEST 2 VIEWS: CPT | Mod: 26

## 2019-07-01 RX ORDER — PANTOPRAZOLE SODIUM 20 MG/1
1 TABLET, DELAYED RELEASE ORAL
Qty: 30 | Refills: 0
Start: 2019-07-01 | End: 2019-07-30

## 2019-07-01 RX ORDER — SENNA PLUS 8.6 MG/1
1 TABLET ORAL
Qty: 14 | Refills: 0
Start: 2019-07-01 | End: 2019-07-14

## 2019-07-01 RX ORDER — AMLODIPINE BESYLATE 2.5 MG/1
1 TABLET ORAL
Qty: 30 | Refills: 0
Start: 2019-07-01 | End: 2019-07-30

## 2019-07-01 RX ORDER — ALBUMIN HUMAN 25 %
250 VIAL (ML) INTRAVENOUS ONCE
Refills: 0 | Status: COMPLETED | OUTPATIENT
Start: 2019-07-01 | End: 2019-07-01

## 2019-07-01 RX ORDER — ATORVASTATIN CALCIUM 80 MG/1
1 TABLET, FILM COATED ORAL
Qty: 30 | Refills: 0
Start: 2019-07-01 | End: 2019-07-30

## 2019-07-01 RX ORDER — ACETAMINOPHEN 500 MG
2 TABLET ORAL
Qty: 112 | Refills: 0
Start: 2019-07-01 | End: 2019-07-14

## 2019-07-01 RX ORDER — DEXTROAMPHETAMINE SACCHARATE, AMPHETAMINE ASPARTATE, DEXTROAMPHETAMINE SULFATE AND AMPHETAMINE SULFATE 1.875; 1.875; 1.875; 1.875 MG/1; MG/1; MG/1; MG/1
20 TABLET ORAL
Refills: 0 | Status: DISCONTINUED | OUTPATIENT
Start: 2019-07-01 | End: 2019-07-01

## 2019-07-01 RX ORDER — DEXTROAMPHETAMINE SACCHARATE, AMPHETAMINE ASPARTATE, DEXTROAMPHETAMINE SULFATE AND AMPHETAMINE SULFATE 1.875; 1.875; 1.875; 1.875 MG/1; MG/1; MG/1; MG/1
1 TABLET ORAL
Qty: 0 | Refills: 0 | DISCHARGE

## 2019-07-01 RX ORDER — METOPROLOL TARTRATE 50 MG
0.5 TABLET ORAL
Qty: 30 | Refills: 0
Start: 2019-07-01 | End: 2019-07-30

## 2019-07-01 RX ORDER — DOCUSATE SODIUM 100 MG
1 CAPSULE ORAL
Qty: 42 | Refills: 0
Start: 2019-07-01 | End: 2019-07-14

## 2019-07-01 RX ORDER — CLOPIDOGREL BISULFATE 75 MG/1
1 TABLET, FILM COATED ORAL
Qty: 30 | Refills: 0
Start: 2019-07-01 | End: 2019-07-30

## 2019-07-01 RX ORDER — DEXTROAMPHETAMINE SACCHARATE, AMPHETAMINE ASPARTATE, DEXTROAMPHETAMINE SULFATE AND AMPHETAMINE SULFATE 1.875; 1.875; 1.875; 1.875 MG/1; MG/1; MG/1; MG/1
1 TABLET ORAL
Qty: 30 | Refills: 0
Start: 2019-07-01 | End: 2019-07-30

## 2019-07-01 RX ORDER — ASPIRIN/CALCIUM CARB/MAGNESIUM 324 MG
1 TABLET ORAL
Qty: 30 | Refills: 0
Start: 2019-07-01 | End: 2019-07-30

## 2019-07-01 RX ORDER — LOSARTAN POTASSIUM 100 MG/1
1 TABLET, FILM COATED ORAL
Qty: 0 | Refills: 0 | DISCHARGE

## 2019-07-01 RX ORDER — VORTIOXETINE 5 MG/1
1 TABLET, FILM COATED ORAL
Qty: 30 | Refills: 0
Start: 2019-07-01 | End: 2019-07-30

## 2019-07-01 RX ORDER — POTASSIUM CHLORIDE 20 MEQ
20 PACKET (EA) ORAL ONCE
Refills: 0 | Status: COMPLETED | OUTPATIENT
Start: 2019-07-01 | End: 2019-07-01

## 2019-07-01 RX ORDER — VORTIOXETINE 5 MG/1
1 TABLET, FILM COATED ORAL
Qty: 0 | Refills: 0 | DISCHARGE

## 2019-07-01 RX ADMIN — Medication 2 TABLET(S): at 12:42

## 2019-07-01 RX ADMIN — Medication 30 MILLIGRAM(S): at 06:03

## 2019-07-01 RX ADMIN — Medication 30 MILLIGRAM(S): at 06:18

## 2019-07-01 RX ADMIN — Medication 20 MILLIEQUIVALENT(S): at 10:55

## 2019-07-01 RX ADMIN — CLOPIDOGREL BISULFATE 75 MILLIGRAM(S): 75 TABLET, FILM COATED ORAL at 11:22

## 2019-07-01 RX ADMIN — Medication 125 MILLILITER(S): at 02:00

## 2019-07-01 RX ADMIN — Medication 100 MILLIGRAM(S): at 06:01

## 2019-07-01 RX ADMIN — AMLODIPINE BESYLATE 2.5 MILLIGRAM(S): 2.5 TABLET ORAL at 07:52

## 2019-07-01 RX ADMIN — Medication 12.5 MILLIGRAM(S): at 10:55

## 2019-07-01 RX ADMIN — HEPARIN SODIUM 5000 UNIT(S): 5000 INJECTION INTRAVENOUS; SUBCUTANEOUS at 06:01

## 2019-07-01 RX ADMIN — Medication 2 TABLET(S): at 07:03

## 2019-07-01 RX ADMIN — VORTIOXETINE 10 MILLIGRAM(S): 5 TABLET, FILM COATED ORAL at 12:48

## 2019-07-01 RX ADMIN — SODIUM CHLORIDE 3 MILLILITER(S): 9 INJECTION INTRAMUSCULAR; INTRAVENOUS; SUBCUTANEOUS at 07:02

## 2019-07-01 RX ADMIN — Medication 125 MILLILITER(S): at 06:00

## 2019-07-01 RX ADMIN — Medication 81 MILLIGRAM(S): at 11:22

## 2019-07-01 RX ADMIN — PANTOPRAZOLE SODIUM 40 MILLIGRAM(S): 20 TABLET, DELAYED RELEASE ORAL at 07:03

## 2019-07-01 NOTE — PROGRESS NOTE ADULT - PROVIDER SPECIALTY LIST ADULT
CT Surgery
Cardiology
Cardiology
Critical Care
Intervent Cardiology
Intervent Cardiology
CT Surgery
Intervent Cardiology

## 2019-07-01 NOTE — DISCHARGE NOTE PROVIDER - NSDCHHNEEDSERVICE_GEN_ALL_CORE
Observation and assessment/Teaching and training/Wound care and assessment/Medication teaching and assessment

## 2019-07-01 NOTE — DISCHARGE NOTE PROVIDER - NSDCFUADDAPPT_GEN_ALL_CORE_FT
-Please follow up with Dr. Boland on ___.  The office is located at [facility], [facility], 4th floor. Call us with any questions, #[phone].  -Please follow up with Dr. Espitia (cardiologist) on .  Address: [address] [place] [address]. Phone: [phone]  -Please follow up with our PCP within 1-2 weeks of discharge. -Please follow up with Dr. Boland on 7/9/19 at 1:30pm. The office is located at [facility], [facility], 4th floor. Call us with any questions, #[phone].  -Please follow up with Dr. Espitia (cardiologist). You will get a call from our office with the appointment. If you do not hear from them within 24 hours, please call.   Address: [address] [place] [address]. Phone: [phone]  -Please follow up with our PCP within 1-2 weeks of discharge.

## 2019-07-01 NOTE — DISCHARGE NOTE PROVIDER - NSDCACTIVITY_GEN_ALL_CORE
Walking - Outdoors allowed/Do not drive or operate machinery/Walking - Indoors allowed/Stairs allowed/Showering allowed/No heavy lifting/straining

## 2019-07-01 NOTE — PROGRESS NOTE ADULT - ASSESSMENT
-Please follow up with Dr. Boland on 7/9/19 at 1:30pm. The office is located at [facility], [facility], 4th floor. Call us with any questions, #[phone].  -Please follow up with Dr. Espitia (cardiologist). You will get a call from our office with the appointment. If you do not hear from them within 24 hours, please call.   Address: [address] [place] [address]. Phone: [phone]  -Please follow up with our PCP within 1-2 weeks of discharge.  -Please keep the dressing where your chest tubes were on for 72 hours after discharge. Then they can be removed and kept open to air.   -Walk daily as tolerated and use your incentive spirometer every hour.  -No driving or strenuous activity/exercise for 6 weeks, or until cleared by your surgeon.  -You may shower.  Be sure to gently clean your incisions with anti-bacterial soap and water daily, pat dry.  You may leave them open to air.  -Call your doctor if you have shortness of breath, chest pain not relieved by pain medication, dizziness, fever >101.5, or increased redness or drainage from incisions.

## 2019-07-01 NOTE — PROGRESS NOTE ADULT - SUBJECTIVE AND OBJECTIVE BOX
Patient discussed on morning rounds with Dr. Boland    Operation / Date: 6/27/19 OPCAB x 5 ( LIMA - D1 seq LAD, SHRUTI- Ramus, Radial LPA seq PDA), EF 60%    Surgeon: Dr. Boland    Referring Physician: Dr. Markus Peng/ Dr. Espitia    SUBJECTIVE ASSESSMENT:  58y Male seen and examined. Feels well, offers no acute complaints. He is ambulating on RA without assistance, using IS pulling 1500cc, tolerating PO diet, + BM last night and this morning. Denies fever, chest pain, palpitations, SOB, abdominal pain, n/v.     Hospital Course:   58 year old M, former drug use (cocaine, ecstacy), sober x 25 years, with FHx of CAD and PMHx of RBBB, HTN, prediabetes and ADHD, previously on Truvada as PrEP, who presented to [facility] ED on 6/22/19 c/o acute onset CP and fatigue.  He was ruled out for PE, but ruled in for NSTEMI. Cardiac cath revealed 3VCAD.  On 6/27/19, pt underwent an uncomplicated OPCABx5 (lima-d1 seq lad, shruti-ramus, radial-lpa seq pda), EF 60%.  Intraop uneventful. arrived on no drips. right fem mckenzie removed then extubated. POD1 b/l pleural chest tubes removed with small R. apical ptx. Mediastinal tube back on sxn, started on Norvasc for arterial conduits, BB held 2/2 soft BP, transferred to 9La. POD2, mediastinal chest tube removed, BB started. POD3, stable. Today POD4, patient is ambulating on RA, without assistance, using IS pullign 1500cc, tolerating PO diet, + BM last night and this morning.  Patient tolerating low dose norvasc and low dose metoprolol. Per Dr. Boland, patient is ready for discharge.     35 minutes was spent with the patient reviewing the discharge material including medications, follow up appointments, recovery, concerning symptoms, and how to contact their health care providers if they have questions     CABG  Aspirin               [ X ] Yes  [  ] Contraindicated, Reason_______________________________  Beta-Blocker     [X  ] Yes  [  ]Contraindicated, Reason_______________________________  Statin                 [ X ] Yes  [  ] Contraindicated, Reason_______________________________     Vital Signs Last 24 Hrs  T(C): 36.8 (01 Jul 2019 09:31), Max: 37.7 (30 Jun 2019 17:02)  T(F): 98.2 (01 Jul 2019 09:31), Max: 99.8 (30 Jun 2019 17:02)  HR: 74 (01 Jul 2019 09:28) (74 - 92)  BP: 110/62 (01 Jul 2019 09:28) (86/41 - 112/69)  BP(mean): 76 (01 Jul 2019 09:28) (53 - 83)  RR: 18 (01 Jul 2019 09:28) (14 - 20)  SpO2: 94% (01 Jul 2019 09:28) (93% - 98%)  I&O's Detail    30 Jun 2019 07:01  -  01 Jul 2019 07:00  --------------------------------------------------------  IN:    Albumin 5%  - 250 mL: 500 mL  Total IN: 500 mL    OUT:    Voided: 900 mL  Total OUT: 900 mL    Total NET: -400 mL    EPICARDIAL WIRES REMOVED: Yes  TIE DOWNS REMOVED: Yes    PHYSICAL EXAM:    General: Patient lying comfortably in bed, no acute distress     Neurological: Alert and oriented. No focal neurological deficits     Cardiovascular: S1S2, RRR, no murmurs appreciated on exam     Respiratory: Clear to ausculation bilaterally, no wheeze/rhonchi/rales    Gastrointestinal: + BS, soft, non tender, non distended     Extremities: Warm and well perfused. No edema, no calf tenderness     Vascular: 2+ Peripheral pulses b/l     Incision Sites: MSI: CDI, no signs of infection/dehiscence/click.  Left radial: CDI, soft, no hematoma/ecchymosis.      LABS:                        9.9    8.26  )-----------( 203      ( 01 Jul 2019 07:09 )             30.5       COUMADIN:   No        07-01    142  |  106  |  10  ----------------------------<  122<H>  3.7   |  25  |  0.95    Ca    8.8      01 Jul 2019 07:09  Phos  2.8     07-01  Mg     2.0     07-01            MEDICATIONS  (STANDING):  SEE med rec      Discharge CXR:  CXR PA/LAT pending official read: trace pleural effusion, tiny R apical airspace, stable.

## 2019-07-01 NOTE — PROGRESS NOTE ADULT - REASON FOR ADMISSION
Chest Pain

## 2019-07-01 NOTE — DISCHARGE NOTE NURSING/CASE MANAGEMENT/SOCIAL WORK - NSDCFUADDAPPT_GEN_ALL_CORE_FT
-Please follow up with Dr. Boland on 7/9/19 at 1:30pm. The office is located at [facility], [facility], 4th floor. Call us with any questions, #[phone].  -Please follow up with Dr. Espitia (cardiologist). You will get a call from our office with the appointment. If you do not hear from them within 24 hours, please call.   Address: [address] [place] [address]. Phone: [phone]  -Please follow up with our PCP within 1-2 weeks of discharge.

## 2019-07-01 NOTE — DISCHARGE NOTE PROVIDER - NSDCFUADDINST_GEN_ALL_CORE_FT
-Please keep the dressing where your chest tubes were on for 72 hours after discharge. Then they can be removed and kept open to air.   -Walk daily as tolerated and use your incentive spirometer every hour.    -No driving or strenuous activity/exercise for 6 weeks, or until cleared by your surgeon.    -You may shower.  Be sure to gently clean your incisions with anti-bacterial soap and water daily, pat dry.  You may leave them open to air.    -Call your doctor if you have shortness of breath, chest pain not relieved by pain medication, dizziness, fever >101.5, or increased redness or drainage from incisions.

## 2019-07-01 NOTE — DISCHARGE NOTE PROVIDER - HOSPITAL COURSE
58 year old M, former drug use (cocaine, ecstacy), sober x 25 years, with FHx of CAD and PMHx of RBBB, HTN, prediabetes and ADHD, previously on Truvada as PrEP, who presented to [facility] ED on 6/22/19 c/o acute onset CP and fatigue.  He was ruled out for PE, but ruled in for NSTEMI. Cardiac cath revealed 3VCAD.  On 6/27/19, pt underwent an uncomplicated OPCABx5 (lima-d1 seq lad, shruti-ramus, radial-lpa seq pda), EF 60%.  Intraop uneventful. arrived on no drips. right fem mckenzie removed then extubated. POD1 b/l pleural chest tubes removed with small R. apical ptx. Mediastinal tube back on sxn, started on Norvasc for arterial conduits, BB held 2/2 soft BP, transferred to 9La. POD2, mediastinal chest tube removed, BB started. POD3, stable. Today POD4, patient is ambulating on RA, without assistance, using IS pullign 1500cc, tolerating PO diet, + BM last night and this morning.  Patient tolerating low dose norvasc and low dose metoprolol. Per Dr. Boland, patient is ready for discharge.         35 minutes was spent with the patient reviewing the discharge material including medications, follow up appointments, recovery, concerning symptoms, and how to contact their health care providers if they have questions         CABG    Aspirin               [ X ] Yes  [  ] Contraindicated, Reason_______________________________    Beta-Blocker     [X  ] Yes  [  ]Contraindicated, Reason_______________________________    Statin                 [ X ] Yes  [  ] Contraindicated, Reason_______________________________

## 2019-07-01 NOTE — DISCHARGE NOTE PROVIDER - CARE PROVIDER_API CALL
Lalito Boland (MD)  Cardiovascular Surgery  [address], [address]th Floor  [place], [place] [address]  Phone: [phone]  Fax: [phone]  Follow Up Time:

## 2019-07-01 NOTE — DISCHARGE NOTE NURSING/CASE MANAGEMENT/SOCIAL WORK - NSDCDPATPORTLINK_GEN_ALL_CORE
You can access the [facility] Patient Portal, offered by [facility], by registering with the following website: [email or web address]

## 2019-07-01 NOTE — CHART NOTE - NSCHARTNOTEFT_GEN_A_CORE
In preparation for today's discharge, patient temporary pacing wires were clipped at the skin edge.  All chest tube tie down stitches were removed.  Gauze/Tegaderm dressing applied to CT sites.    Patient hemodynamically stable in NSR.  Patient tolerated this well.

## 2019-07-03 DIAGNOSIS — Z82.49 FAMILY HISTORY OF ISCHEMIC HEART DISEASE AND OTHER DISEASES OF THE CIRCULATORY SYSTEM: ICD-10-CM

## 2019-07-03 DIAGNOSIS — Z87.898 PERSONAL HISTORY OF OTHER SPECIFIED CONDITIONS: ICD-10-CM

## 2019-07-03 DIAGNOSIS — Z86.39 PERSONAL HISTORY OF OTHER ENDOCRINE, NUTRITIONAL AND METABOLIC DISEASE: ICD-10-CM

## 2019-07-03 DIAGNOSIS — F90.9 ATTENTION-DEFICIT HYPERACTIVITY DISORDER, UNSPECIFIED TYPE: ICD-10-CM

## 2019-07-03 DIAGNOSIS — Z86.79 PERSONAL HISTORY OF OTHER DISEASES OF THE CIRCULATORY SYSTEM: ICD-10-CM

## 2019-07-03 DIAGNOSIS — R73.03 PREDIABETES.: ICD-10-CM

## 2019-07-03 DIAGNOSIS — I21.4 NON-ST ELEVATION (NSTEMI) MYOCARDIAL INFARCTION: ICD-10-CM

## 2019-07-03 RX ORDER — VORTIOXETINE 10 MG/1
10 TABLET, FILM COATED ORAL DAILY
Refills: 0 | Status: ACTIVE | COMMUNITY

## 2019-07-03 RX ORDER — DEXTROAMPHETAMINE SULFATE 10 MG/1
10 CAPSULE, EXTENDED RELEASE ORAL DAILY
Refills: 0 | Status: ACTIVE | COMMUNITY

## 2019-07-08 ENCOUNTER — TRANSCRIPTION ENCOUNTER (OUTPATIENT)
Age: 59
End: 2019-07-08

## 2019-07-08 ENCOUNTER — APPOINTMENT (OUTPATIENT)
Dept: HEART AND VASCULAR | Facility: CLINIC | Age: 59
End: 2019-07-08
Payer: COMMERCIAL

## 2019-07-08 ENCOUNTER — FORM ENCOUNTER (OUTPATIENT)
Age: 59
End: 2019-07-08

## 2019-07-08 VITALS
SYSTOLIC BLOOD PRESSURE: 122 MMHG | TEMPERATURE: 98.3 F | DIASTOLIC BLOOD PRESSURE: 72 MMHG | HEIGHT: 72.5 IN | BODY MASS INDEX: 25.32 KG/M2 | HEART RATE: 81 BPM | OXYGEN SATURATION: 98 % | WEIGHT: 189 LBS

## 2019-07-08 PROCEDURE — 99214 OFFICE O/P EST MOD 30 MIN: CPT

## 2019-07-09 ENCOUNTER — APPOINTMENT (OUTPATIENT)
Dept: CARDIOTHORACIC SURGERY | Facility: CLINIC | Age: 59
End: 2019-07-09
Payer: COMMERCIAL

## 2019-07-09 ENCOUNTER — OUTPATIENT (OUTPATIENT)
Dept: OUTPATIENT SERVICES | Facility: HOSPITAL | Age: 59
LOS: 1 days | End: 2019-07-09
Payer: COMMERCIAL

## 2019-07-09 VITALS
OXYGEN SATURATION: 98 % | TEMPERATURE: 97.8 F | BODY MASS INDEX: 25.33 KG/M2 | HEIGHT: 72 IN | SYSTOLIC BLOOD PRESSURE: 114 MMHG | WEIGHT: 187 LBS | HEART RATE: 80 BPM | RESPIRATION RATE: 19 BRPM | DIASTOLIC BLOOD PRESSURE: 66 MMHG

## 2019-07-09 DIAGNOSIS — Z98.890 OTHER SPECIFIED POSTPROCEDURAL STATES: Chronic | ICD-10-CM

## 2019-07-09 PROCEDURE — 71046 X-RAY EXAM CHEST 2 VIEWS: CPT

## 2019-07-09 PROCEDURE — 99024 POSTOP FOLLOW-UP VISIT: CPT

## 2019-07-09 PROCEDURE — 71046 X-RAY EXAM CHEST 2 VIEWS: CPT | Mod: 26

## 2019-07-09 NOTE — PHYSICAL EXAM
[General Appearance - Well Developed] : well developed [Normal Appearance] : normal appearance [Well Groomed] : well groomed [General Appearance - Well Nourished] : well nourished [No Deformities] : no deformities [Normal Conjunctiva] : the conjunctiva exhibited no abnormalities [General Appearance - In No Acute Distress] : no acute distress [Normal Oral Mucosa] : normal oral mucosa [Eyelids - No Xanthelasma] : the eyelids demonstrated no xanthelasmas [No Oral Pallor] : no oral pallor [No Oral Cyanosis] : no oral cyanosis [Normal Jugular Venous A Waves Present] : normal jugular venous A waves present [Normal Jugular Venous V Waves Present] : normal jugular venous V waves present [No Jugular Venous Matthews A Waves] : no jugular venous matthews A waves [Heart Rate And Rhythm] : heart rate and rhythm were normal [Heart Sounds] : normal S1 and S2 [Murmurs] : no murmurs present [Respiration, Rhythm And Depth] : normal respiratory rhythm and effort [Exaggerated Use Of Accessory Muscles For Inspiration] : no accessory muscle use [Auscultation Breath Sounds / Voice Sounds] : lungs were clear to auscultation bilaterally [Abdomen Soft] : soft [Abdomen Tenderness] : non-tender [Abnormal Walk] : normal gait [Abdomen Mass (___ Cm)] : no abdominal mass palpated [Gait - Sufficient For Exercise Testing] : the gait was sufficient for exercise testing [Nail Clubbing] : no clubbing of the fingernails [Cyanosis, Localized] : no localized cyanosis [Petechial Hemorrhages (___cm)] : no petechial hemorrhages [Skin Color & Pigmentation] : normal skin color and pigmentation [] : no rash [No Venous Stasis] : no venous stasis [Skin Lesions] : no skin lesions [No Skin Ulcers] : no skin ulcer [Oriented To Time, Place, And Person] : oriented to person, place, and time [No Xanthoma] : no  xanthoma was observed [Affect] : the affect was normal [Mood] : the mood was normal [No Anxiety] : not feeling anxious

## 2019-07-09 NOTE — REASON FOR VISIT
[Initial Evaluation] : an initial evaluation of [CABG Follow-up] : bypass graft [FreeTextEntry1] : 58 year old man presents after a recent hospital discharge. The patient presented initially to [facility] secondary to chest discomfort and fatigue. He underwent an angiogram with Dr Martínez and subsequent CABG with Dr Martínez. He now presents to initiate o/p cardiac follow up. As to symptoms, he notes occasional atypical chest discomfort. He is still a bit winded on occasion. His recovery otherwise has been unremarkable.

## 2019-07-10 DIAGNOSIS — I50.32 CHRONIC DIASTOLIC (CONGESTIVE) HEART FAILURE: ICD-10-CM

## 2019-07-10 DIAGNOSIS — J95.811 POSTPROCEDURAL PNEUMOTHORAX: ICD-10-CM
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
## 2019-07-10 DIAGNOSIS — R73.03 PREDIABETES: ICD-10-CM

## 2019-07-10 DIAGNOSIS — Z87.891 PERSONAL HISTORY OF NICOTINE DEPENDENCE: ICD-10-CM

## 2019-07-10 DIAGNOSIS — I45.10 UNSPECIFIED RIGHT BUNDLE-BRANCH BLOCK: ICD-10-CM

## 2019-07-10 DIAGNOSIS — I25.118 ATHEROSCLEROTIC HEART DISEASE OF NATIVE CORONARY ARTERY WITH OTHER FORMS OF ANGINA PECTORIS: ICD-10-CM

## 2019-07-10 DIAGNOSIS — F32.9 MAJOR DEPRESSIVE DISORDER, SINGLE EPISODE, UNSPECIFIED: ICD-10-CM

## 2019-07-10 DIAGNOSIS — F90.9 ATTENTION-DEFICIT HYPERACTIVITY DISORDER, UNSPECIFIED TYPE: ICD-10-CM
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
## 2019-07-10 DIAGNOSIS — K21.9 GASTRO-ESOPHAGEAL REFLUX DISEASE WITHOUT ESOPHAGITIS: ICD-10-CM

## 2019-07-10 DIAGNOSIS — I11.0 HYPERTENSIVE HEART DISEASE WITH HEART FAILURE: ICD-10-CM

## 2019-07-10 DIAGNOSIS — R00.1 BRADYCARDIA, UNSPECIFIED: ICD-10-CM

## 2019-07-10 DIAGNOSIS — Z87.898 PERSONAL HISTORY OF OTHER SPECIFIED CONDITIONS: ICD-10-CM

## 2019-07-10 DIAGNOSIS — I21.4 NON-ST ELEVATION (NSTEMI) MYOCARDIAL INFARCTION: ICD-10-CM

## 2019-07-17 ENCOUNTER — APPOINTMENT (OUTPATIENT)
Dept: HEART AND VASCULAR | Facility: CLINIC | Age: 59
End: 2019-07-17

## 2019-07-17 ENCOUNTER — TRANSCRIPTION ENCOUNTER (OUTPATIENT)
Age: 59
End: 2019-07-17

## 2019-07-17 ENCOUNTER — OTHER (OUTPATIENT)
Age: 59
End: 2019-07-17

## 2019-07-17 RX ORDER — OXYCODONE AND ACETAMINOPHEN 5; 325 MG/1; MG/1
5-325 TABLET ORAL
Qty: 10 | Refills: 0 | Status: COMPLETED | COMMUNITY
End: 2019-07-17

## 2019-07-17 RX ORDER — PANTOPRAZOLE 40 MG/1
40 TABLET, DELAYED RELEASE ORAL DAILY
Refills: 0 | Status: COMPLETED | COMMUNITY
End: 2019-07-17

## 2019-07-18 ENCOUNTER — TRANSCRIPTION ENCOUNTER (OUTPATIENT)
Age: 59
End: 2019-07-18

## 2019-08-26 ENCOUNTER — TRANSCRIPTION ENCOUNTER (OUTPATIENT)
Age: 59
End: 2019-08-26

## 2019-08-28 ENCOUNTER — NON-APPOINTMENT (OUTPATIENT)
Age: 59
End: 2019-08-28

## 2019-08-28 ENCOUNTER — APPOINTMENT (OUTPATIENT)
Dept: HEART AND VASCULAR | Facility: CLINIC | Age: 59
End: 2019-08-28
Payer: COMMERCIAL

## 2019-08-28 VITALS
SYSTOLIC BLOOD PRESSURE: 131 MMHG | WEIGHT: 184 LBS | OXYGEN SATURATION: 97 % | DIASTOLIC BLOOD PRESSURE: 86 MMHG | BODY MASS INDEX: 24.92 KG/M2 | HEART RATE: 77 BPM | HEIGHT: 72 IN

## 2019-08-28 PROCEDURE — 99214 OFFICE O/P EST MOD 30 MIN: CPT

## 2019-08-28 NOTE — PHYSICAL EXAM
[General Appearance - Well Developed] : well developed [Normal Appearance] : normal appearance [General Appearance - Well Nourished] : well nourished [Well Groomed] : well groomed [No Deformities] : no deformities [General Appearance - In No Acute Distress] : no acute distress [Normal Conjunctiva] : the conjunctiva exhibited no abnormalities [Eyelids - No Xanthelasma] : the eyelids demonstrated no xanthelasmas [Normal Oral Mucosa] : normal oral mucosa [No Oral Pallor] : no oral pallor [No Oral Cyanosis] : no oral cyanosis [Normal Jugular Venous A Waves Present] : normal jugular venous A waves present [Normal Jugular Venous V Waves Present] : normal jugular venous V waves present [No Jugular Venous Matthews A Waves] : no jugular venous matthews A waves [Respiration, Rhythm And Depth] : normal respiratory rhythm and effort [Exaggerated Use Of Accessory Muscles For Inspiration] : no accessory muscle use [Auscultation Breath Sounds / Voice Sounds] : lungs were clear to auscultation bilaterally [Heart Rate And Rhythm] : heart rate and rhythm were normal [Heart Sounds] : normal S1 and S2 [Murmurs] : no murmurs present [Abdomen Soft] : soft [Abdomen Tenderness] : non-tender [Abdomen Mass (___ Cm)] : no abdominal mass palpated [Abnormal Walk] : normal gait [Gait - Sufficient For Exercise Testing] : the gait was sufficient for exercise testing [Nail Clubbing] : no clubbing of the fingernails [Cyanosis, Localized] : no localized cyanosis [Petechial Hemorrhages (___cm)] : no petechial hemorrhages [Skin Color & Pigmentation] : normal skin color and pigmentation [] : no rash [No Venous Stasis] : no venous stasis [Skin Lesions] : no skin lesions [No Skin Ulcers] : no skin ulcer [No Xanthoma] : no  xanthoma was observed [Oriented To Time, Place, And Person] : oriented to person, place, and time [Affect] : the affect was normal [Mood] : the mood was normal [No Anxiety] : not feeling anxious

## 2019-08-28 NOTE — REASON FOR VISIT
[Follow-Up - Clinic] : a clinic follow-up of [Coronary Artery Disease] : coronary artery disease [Hyperlipidemia] : hyperlipidemia [Hypertension] : hypertension [FreeTextEntry1] : Mr. NAJERA presents for a follow up today. He denies chest pain, dyspnea or palpitations. No issues reported with his medications. Otherwise, he is feeling well.\par

## 2019-09-13 ENCOUNTER — TRANSCRIPTION ENCOUNTER (OUTPATIENT)
Age: 59
End: 2019-09-13

## 2019-11-07 ENCOUNTER — TRANSCRIPTION ENCOUNTER (OUTPATIENT)
Age: 59
End: 2019-11-07

## 2019-11-22 ENCOUNTER — APPOINTMENT (OUTPATIENT)
Dept: HEART AND VASCULAR | Facility: CLINIC | Age: 59
End: 2019-11-22
Payer: COMMERCIAL

## 2019-11-22 PROCEDURE — 36415 COLL VENOUS BLD VENIPUNCTURE: CPT

## 2019-11-25 LAB
25(OH)D3 SERPL-MCNC: 34.3 NG/ML
ALBUMIN SERPL ELPH-MCNC: 4.9 G/DL
ALP BLD-CCNC: 85 U/L
ALT SERPL-CCNC: 21 U/L
ANION GAP SERPL CALC-SCNC: 19 MMOL/L
AST SERPL-CCNC: 19 U/L
BASOPHILS # BLD AUTO: 0.05 K/UL
BASOPHILS NFR BLD AUTO: 0.6 %
BILIRUB SERPL-MCNC: 0.4 MG/DL
BUN SERPL-MCNC: 22 MG/DL
CALCIUM SERPL-MCNC: 10.1 MG/DL
CHLORIDE SERPL-SCNC: 105 MMOL/L
CHOLEST SERPL-MCNC: 125 MG/DL
CHOLEST/HDLC SERPL: 2.3 RATIO
CO2 SERPL-SCNC: 18 MMOL/L
CREAT SERPL-MCNC: 0.89 MG/DL
EOSINOPHIL # BLD AUTO: 0.09 K/UL
EOSINOPHIL NFR BLD AUTO: 1.2 %
ESTIMATED AVERAGE GLUCOSE: 137 MG/DL
GLUCOSE SERPL-MCNC: 111 MG/DL
HBA1C MFR BLD HPLC: 6.4 %
HCT VFR BLD CALC: 45 %
HDLC SERPL-MCNC: 55 MG/DL
HGB BLD-MCNC: 14.6 G/DL
IMM GRANULOCYTES NFR BLD AUTO: 0.3 %
LDLC SERPL CALC-MCNC: 50 MG/DL
LYMPHOCYTES # BLD AUTO: 1.85 K/UL
LYMPHOCYTES NFR BLD AUTO: 24 %
MAGNESIUM SERPL-MCNC: 2.3 MG/DL
MAN DIFF?: NORMAL
MCHC RBC-ENTMCNC: 27.5 PG
MCHC RBC-ENTMCNC: 32.4 GM/DL
MCV RBC AUTO: 84.9 FL
MONOCYTES # BLD AUTO: 0.68 K/UL
MONOCYTES NFR BLD AUTO: 8.8 %
NEUTROPHILS # BLD AUTO: 5.03 K/UL
NEUTROPHILS NFR BLD AUTO: 65.1 %
PLATELET # BLD AUTO: 279 K/UL
POTASSIUM SERPL-SCNC: 4.5 MMOL/L
PROT SERPL-MCNC: 7.2 G/DL
RBC # BLD: 5.3 M/UL
RBC # FLD: 15 %
SODIUM SERPL-SCNC: 142 MMOL/L
T3FREE SERPL-MCNC: 3.09 PG/ML
T4 FREE SERPL-MCNC: 1.4 NG/DL
TRIGL SERPL-MCNC: 98 MG/DL
TSH SERPL-ACNC: 1.48 UIU/ML
VIT B12 SERPL-MCNC: 280 PG/ML
WBC # FLD AUTO: 7.72 K/UL

## 2019-12-11 ENCOUNTER — APPOINTMENT (OUTPATIENT)
Dept: HEART AND VASCULAR | Facility: CLINIC | Age: 59
End: 2019-12-11
Payer: COMMERCIAL

## 2019-12-11 VITALS
SYSTOLIC BLOOD PRESSURE: 141 MMHG | BODY MASS INDEX: 25.33 KG/M2 | HEIGHT: 72 IN | HEART RATE: 82 BPM | DIASTOLIC BLOOD PRESSURE: 94 MMHG | TEMPERATURE: 97.7 F | OXYGEN SATURATION: 97 % | WEIGHT: 187 LBS

## 2019-12-11 PROCEDURE — 99214 OFFICE O/P EST MOD 30 MIN: CPT

## 2019-12-11 RX ORDER — METOPROLOL TARTRATE 25 MG/1
25 TABLET, FILM COATED ORAL
Qty: 180 | Refills: 3 | Status: COMPLETED | COMMUNITY
End: 2019-12-11

## 2019-12-11 RX ORDER — CLOPIDOGREL BISULFATE 75 MG/1
75 TABLET, FILM COATED ORAL DAILY
Qty: 90 | Refills: 3 | Status: COMPLETED | COMMUNITY
End: 2019-12-11

## 2019-12-14 NOTE — DISCUSSION/SUMMARY
[FreeTextEntry1] : CAD s/p CABG cont ASA will hold plavix\par HTN - PARAMJIT  and I had an extensive discussion regarding his blood pressure management. Patient will continue taking current medications in addition to maintaining a low Na diet, with periodic b/p checks at home.\par HLD PARAMJIT and I discussed his lipid panel and individualized target LDL goal. At this point, will do diet and exercise with anticipation of re-evaluating labs in 3-6 months\par

## 2019-12-14 NOTE — REASON FOR VISIT
[Follow-Up - Clinic] : a clinic follow-up of [CABG Follow-up] : bypass graft [Hyperlipidemia] : hyperlipidemia [Hypertension] : hypertension [FreeTextEntry1] : Mr. NAJERA presents for a follow up today. He denies chest pain, dyspnea or palpitations. No issues reported with his medications. Otherwise, he is feeling well.\par

## 2019-12-14 NOTE — PHYSICAL EXAM
[Normal Appearance] : normal appearance [General Appearance - Well Developed] : well developed [Well Groomed] : well groomed [General Appearance - Well Nourished] : well nourished [No Deformities] : no deformities [General Appearance - In No Acute Distress] : no acute distress [Normal Conjunctiva] : the conjunctiva exhibited no abnormalities [Eyelids - No Xanthelasma] : the eyelids demonstrated no xanthelasmas [Normal Oral Mucosa] : normal oral mucosa [No Oral Pallor] : no oral pallor [No Oral Cyanosis] : no oral cyanosis [Normal Jugular Venous A Waves Present] : normal jugular venous A waves present [Normal Jugular Venous V Waves Present] : normal jugular venous V waves present [No Jugular Venous Matthews A Waves] : no jugular venous matthews A waves [Respiration, Rhythm And Depth] : normal respiratory rhythm and effort [Exaggerated Use Of Accessory Muscles For Inspiration] : no accessory muscle use [Auscultation Breath Sounds / Voice Sounds] : lungs were clear to auscultation bilaterally [Heart Rate And Rhythm] : heart rate and rhythm were normal [Heart Sounds] : normal S1 and S2 [Murmurs] : no murmurs present [Abdomen Soft] : soft [Abdomen Tenderness] : non-tender [Abdomen Mass (___ Cm)] : no abdominal mass palpated [Abnormal Walk] : normal gait [Gait - Sufficient For Exercise Testing] : the gait was sufficient for exercise testing [Nail Clubbing] : no clubbing of the fingernails [Cyanosis, Localized] : no localized cyanosis [Petechial Hemorrhages (___cm)] : no petechial hemorrhages [Skin Color & Pigmentation] : normal skin color and pigmentation [] : no rash [No Venous Stasis] : no venous stasis [Skin Lesions] : no skin lesions [No Skin Ulcers] : no skin ulcer [No Xanthoma] : no  xanthoma was observed [Oriented To Time, Place, And Person] : oriented to person, place, and time [Affect] : the affect was normal [Mood] : the mood was normal [No Anxiety] : not feeling anxious

## 2019-12-26 NOTE — PROGRESS NOTE ADULT - PROBLEM/PLAN-2
"Requested Prescriptions   Pending Prescriptions Disp Refills     mirtazapine (REMERON) 30 MG tablet 90 tablet 0     Sig: Take 1 tablet (30 mg) by mouth At Bedtime       Atypical Antidepressants Protocol Passed - 12/26/2019 11:23 AM        Passed - Recent (12 mo) or future (30 days) visit within the authorizing provider's specialty     Patient has had an office visit with the authorizing provider or a provider within the authorizing providers department within the previous 12 mos or has a future within next 30 days. See \"Patient Info\" tab in inbasket, or \"Choose Columns\" in Meds & Orders section of the refill encounter.              Passed - Medication active on med list        Passed - Patient is age 18 or older        Look like this is a dupilcation from 12/20/19  "
DISPLAY PLAN FREE TEXT

## 2020-03-19 ENCOUNTER — TRANSCRIPTION ENCOUNTER (OUTPATIENT)
Age: 60
End: 2020-03-19

## 2020-06-22 ENCOUNTER — RX RENEWAL (OUTPATIENT)
Age: 60
End: 2020-06-22

## 2020-07-08 ENCOUNTER — APPOINTMENT (OUTPATIENT)
Dept: HEART AND VASCULAR | Facility: CLINIC | Age: 60
End: 2020-07-08
Payer: COMMERCIAL

## 2020-07-08 DIAGNOSIS — Z09 ENCOUNTER FOR FOLLOW-UP EXAMINATION AFTER COMPLETED TREATMENT FOR CONDITIONS OTHER THAN MALIGNANT NEOPLASM: ICD-10-CM

## 2020-07-08 PROCEDURE — 99214 OFFICE O/P EST MOD 30 MIN: CPT | Mod: 95

## 2020-07-08 RX ORDER — ARIPIPRAZOLE 2 MG/1
2 TABLET ORAL
Refills: 0 | Status: ACTIVE | COMMUNITY
Start: 2020-07-08

## 2020-07-08 NOTE — DISCUSSION/SUMMARY
[FreeTextEntry1] : SOB/fatigue PARAMJIT and I had a discussion of his symptoms. His risk for CAD falls intro intermediate. We will therefore move forward with a stress echo at this time to evaluate for obstructive CAD. Risks and benefits discussed.\par HTN - PARAMJIT  and I had an extensive discussion regarding his blood pressure management. Patient will continue taking current medications in addition to maintaining a low Na diet, with periodic b/p checks at home.\par HLD PARAMJIT and I discussed his lipid panel and individualized target LDL goal. At this point, will do diet and exercise with anticipation of re-evaluating labs in 3-6 months\par Carotid disease minor disease noted on exam; will manage medically with current medications and re-evaluate lipids in 3-6 months\par

## 2020-07-08 NOTE — REASON FOR VISIT
[Follow-Up - Clinic] : a clinic follow-up of [CABG Follow-up] : bypass graft [Hyperlipidemia] : hyperlipidemia [Hypertension] : hypertension [FreeTextEntry1] : This visit was provided via telehealth using real-time 2 way audio visual technology. The patient, PARAMJIT NAJERA, was located at home, [address]\[place] [address] , at the time of the vist. The Doctor, Jori Ugalde MD, [facility], was located at his medical office located at [address] [place] [address], [address] Floor, [place] [address] at the time of the visit. The patient, PARAMJIT NAJERA and the Doctor participated in telehealth encounter. Verbal consent was given on Jul 08, 2020  by the patient, self.\par \par Doing fair. No issues with medications. However, he reports occasional dyspnea after cycling. This is often noted with activity. Symptoms always improve at rest. No chest discomfort noted. It's been about a year since his CABG and we are discussing cardiac testing as he has not had one recently. \par

## 2020-09-09 ENCOUNTER — APPOINTMENT (OUTPATIENT)
Dept: HEART AND VASCULAR | Facility: CLINIC | Age: 60
End: 2020-09-09
Payer: COMMERCIAL

## 2020-09-09 VITALS
HEART RATE: 98 BPM | HEIGHT: 72 IN | WEIGHT: 187 LBS | SYSTOLIC BLOOD PRESSURE: 130 MMHG | BODY MASS INDEX: 25.33 KG/M2 | OXYGEN SATURATION: 98 % | DIASTOLIC BLOOD PRESSURE: 83 MMHG | TEMPERATURE: 98.2 F

## 2020-09-09 PROCEDURE — 99214 OFFICE O/P EST MOD 30 MIN: CPT | Mod: 25

## 2020-09-09 PROCEDURE — 93351 STRESS TTE COMPLETE: CPT

## 2020-09-09 PROCEDURE — 99214 OFFICE O/P EST MOD 30 MIN: CPT

## 2020-09-09 NOTE — DISCUSSION/SUMMARY
[FreeTextEntry1] : CAD cont ASA, stress echo results reviewed\par HTN - PARAMJIT  and I had an extensive discussion regarding his blood pressure management. Patient will continue taking current medications in addition to maintaining a low Na diet, with periodic b/p checks at home.\par HLD PARAMJIT and I discussed his lipid panel and individualized target LDL goal. At this point, will do diet and exercise with anticipation of re-evaluating labs in 3-6 months\par ED refer to K[facility]LES[facility]

## 2020-09-09 NOTE — REASON FOR VISIT
[Coronary Artery Disease] : coronary artery disease [Follow-Up - Clinic] : a clinic follow-up of [CABG Follow-up] : bypass graft [Hyperlipidemia] : hyperlipidemia [Hypertension] : hypertension [FreeTextEntry1] : Anjel completed a stress echocardiogram today. He did well and we are discussing results. Since last we chatted, he is exercising regularly. No chest discomfort, dyspnea or palpitations reported. He mentions ED issues and lack of a sex drive.

## 2020-09-09 NOTE — PHYSICAL EXAM
[Normal Appearance] : normal appearance [General Appearance - Well Developed] : well developed [No Deformities] : no deformities [Well Groomed] : well groomed [General Appearance - Well Nourished] : well nourished [General Appearance - In No Acute Distress] : no acute distress [Normal Conjunctiva] : the conjunctiva exhibited no abnormalities [Eyelids - No Xanthelasma] : the eyelids demonstrated no xanthelasmas [Normal Oral Mucosa] : normal oral mucosa [No Oral Pallor] : no oral pallor [No Oral Cyanosis] : no oral cyanosis [Normal Jugular Venous V Waves Present] : normal jugular venous V waves present [No Jugular Venous Mtathews A Waves] : no jugular venous matthews A waves [Normal Jugular Venous A Waves Present] : normal jugular venous A waves present [Respiration, Rhythm And Depth] : normal respiratory rhythm and effort [Heart Rate And Rhythm] : heart rate and rhythm were normal [Auscultation Breath Sounds / Voice Sounds] : lungs were clear to auscultation bilaterally [Exaggerated Use Of Accessory Muscles For Inspiration] : no accessory muscle use [Heart Sounds] : normal S1 and S2 [Murmurs] : no murmurs present [Abdomen Soft] : soft [Abdomen Tenderness] : non-tender [Abdomen Mass (___ Cm)] : no abdominal mass palpated [Abnormal Walk] : normal gait [Nail Clubbing] : no clubbing of the fingernails [Gait - Sufficient For Exercise Testing] : the gait was sufficient for exercise testing [Petechial Hemorrhages (___cm)] : no petechial hemorrhages [Cyanosis, Localized] : no localized cyanosis [Skin Color & Pigmentation] : normal skin color and pigmentation [] : no rash [No Venous Stasis] : no venous stasis [Skin Lesions] : no skin lesions [No Xanthoma] : no  xanthoma was observed [No Skin Ulcers] : no skin ulcer [Oriented To Time, Place, And Person] : oriented to person, place, and time [No Anxiety] : not feeling anxious [Mood] : the mood was normal [Affect] : the affect was normal

## 2020-10-05 ENCOUNTER — APPOINTMENT (OUTPATIENT)
Dept: UROLOGY | Facility: CLINIC | Age: 60
End: 2020-10-05
Payer: COMMERCIAL

## 2020-10-05 VITALS
SYSTOLIC BLOOD PRESSURE: 126 MMHG | DIASTOLIC BLOOD PRESSURE: 74 MMHG | BODY MASS INDEX: 25.87 KG/M2 | TEMPERATURE: 97.6 F | HEART RATE: 81 BPM | HEIGHT: 72 IN | WEIGHT: 191 LBS | OXYGEN SATURATION: 98 %

## 2020-10-05 PROCEDURE — 99203 OFFICE O/P NEW LOW 30 MIN: CPT

## 2020-10-05 NOTE — PHYSICAL EXAM
[Normal Appearance] : normal appearance [Well Groomed] : well groomed [Edema] : no peripheral edema [Exaggerated Use Of Accessory Muscles For Inspiration] : no accessory muscle use [Abdomen Soft] : soft [Abdomen Tenderness] : non-tender [] : no hepato-splenomegaly [Abdomen Mass (___ Cm)] : no abdominal mass palpated [Abdomen Hernia] : no hernia was discovered [Costovertebral Angle Tenderness] : no ~M costovertebral angle tenderness [Urethral Meatus] : meatus normal [Penis Abnormality] : normal circumcised penis [Epididymis] : the epididymides were normal [Testes Tenderness] : no tenderness of the testes [Testes Mass (___cm)] : there were no testicular masses [Prostate Enlargement] : the prostate was not enlarged [Prostate Tenderness] : the prostate was not tender [No Prostate Nodules] : no prostate nodules [Normal Station and Gait] : the gait and station were normal for the patient's age [Skin Color & Pigmentation] : normal skin color and pigmentation [FreeTextEntry1] : DTR, Babinski, proprioception normal [Oriented To Time, Place, And Person] : oriented to person, place, and time [Inguinal Lymph Nodes Enlarged Bilaterally] : inguinal

## 2020-10-05 NOTE — ASSESSMENT
[FreeTextEntry1] : 60 year old flores single male with history of CAD s/p CABG x 5\par presents with:\par 1. erectile dysfunction\par 2. loss of libido\par 3. nocturia\par We discussed evaluation and impact of CAD on physiology and psychology of erectile function\par DIscussed  treatment options including PD 5-I, Intracavernosal therapy, erection vacuum device.\par Instructional materials provided to patient.\par Patient had previously attempt sildenafil on one occasion without good response\par Discussed PD5-I possible side effects, onset of action, duration of action, and food interactions.  Discussed behavioral strategies to optimize efficacy of medication.  Warned re priapism risk and need for immediate intervention if erection lasts more than 2 hours.  Patient instructed to report to an emergency room and explicitly inform staff of his condition and need for treatment.\par Patient was directed to [facility] Web site for additional information regarding condition, management and options\par \par Of note is the fact that patient previously had a T which was reported as normal .  The result was reported to 300.  Discussed MERARY Luo.  300 would warrant testosterone therapy.  Discussed \par We discussed recent data regarding increased risk of coronary plaque size, heart disease\par Will carry out endocrine evaluation\par Nocturia which began when started medication for ADD.  Discussed potential impact.  PVR 60 cc; Poor flow\par Discussed potential cessation.  Patient will review with neuropsychologist.\par \par Plan:\par 1. endocrine evaluation\par 2. sildenafil\par 3. Cessation of medication for ADD\par 4. consider T replacement\par followup post above\par

## 2020-10-05 NOTE — HISTORY OF PRESENT ILLNESS
[FreeTextEntry1] : 60 year old male\par \par single flores no current partner\par no sexual activity since 6.2019 at time had MI treated with quintuple by pass at [facility] \par recent stress test was normal\par \par currently no desire\par unable to obtain or sustain erection\par able to ejaculate with poor quality erection\par premature ejaculation prior to surgery\par has taken Viagra on one occasion\par no response to \par \par urinary symptoms\par nocturia x 2-3\par urgency \par hesitancy and interrupted stream\par incomplete sensation of emptying\par started on Strattera for ADD started month\par

## 2020-10-06 LAB
APPEARANCE: CLEAR
BACTERIA: NEGATIVE
BILIRUBIN URINE: NEGATIVE
BLOOD URINE: NEGATIVE
COLOR: YELLOW
GLUCOSE QUALITATIVE U: NEGATIVE
HYALINE CASTS: 1 /LPF
KETONES URINE: NEGATIVE
LEUKOCYTE ESTERASE URINE: NEGATIVE
MICROSCOPIC-UA: NORMAL
NITRITE URINE: NEGATIVE
PH URINE: 6.5
PROTEIN URINE: NEGATIVE
RED BLOOD CELLS URINE: 4 /HPF
SPECIFIC GRAVITY URINE: 1.02
SQUAMOUS EPITHELIAL CELLS: 0 /HPF
UROBILINOGEN URINE: NORMAL
WHITE BLOOD CELLS URINE: 1 /HPF

## 2020-10-20 ENCOUNTER — TRANSCRIPTION ENCOUNTER (OUTPATIENT)
Age: 60
End: 2020-10-20

## 2020-10-20 LAB
ESTRADIOL SERPL-MCNC: 25 PG/ML
PROLACTIN SERPL-MCNC: 8.8 NG/ML
PSA SERPL-MCNC: 2.99 NG/ML
TESTOST SERPL-MCNC: 512 NG/DL

## 2020-11-02 ENCOUNTER — APPOINTMENT (OUTPATIENT)
Dept: UROLOGY | Facility: CLINIC | Age: 60
End: 2020-11-02
Payer: COMMERCIAL

## 2020-11-02 VITALS
OXYGEN SATURATION: 96 % | SYSTOLIC BLOOD PRESSURE: 118 MMHG | TEMPERATURE: 98.1 F | HEART RATE: 84 BPM | DIASTOLIC BLOOD PRESSURE: 70 MMHG

## 2020-11-02 PROCEDURE — 99213 OFFICE O/P EST LOW 20 MIN: CPT

## 2020-11-02 PROCEDURE — 99072 ADDL SUPL MATRL&STAF TM PHE: CPT

## 2020-11-02 NOTE — HISTORY OF PRESENT ILLNESS
[FreeTextEntry1] : 60 year old male\par \par single flores no current partner\par no sexual activity since 6.2019 at time had MI treated with quintuple by pass at [facility] \par recent stress test was normal\par \par currently no desire\par unable to obtain or sustain erection\par able to ejaculate with poor quality erection\par premature ejaculation prior to surgery\par has taken Viagra on one occasion\par no response to \par \par urinary symptoms\par nocturia x 2-3\par urgency \par hesitancy and interrupted stream\par incomplete sensation of emptying\par started on Strattera for ADD started month\par \par 11.02.2020

## 2020-11-03 ENCOUNTER — TRANSCRIPTION ENCOUNTER (OUTPATIENT)
Age: 60
End: 2020-11-03

## 2020-11-30 ENCOUNTER — TRANSCRIPTION ENCOUNTER (OUTPATIENT)
Age: 60
End: 2020-11-30

## 2020-12-08 ENCOUNTER — TRANSCRIPTION ENCOUNTER (OUTPATIENT)
Age: 60
End: 2020-12-08

## 2020-12-11 ENCOUNTER — APPOINTMENT (OUTPATIENT)
Dept: HEART AND VASCULAR | Facility: CLINIC | Age: 60
End: 2020-12-11
Payer: COMMERCIAL

## 2020-12-11 PROCEDURE — 99214 OFFICE O/P EST MOD 30 MIN: CPT | Mod: 95

## 2020-12-11 NOTE — PHYSICAL EXAM
[General Appearance - Well Developed] : well developed [Normal Appearance] : normal appearance [Well Groomed] : well groomed [General Appearance - Well Nourished] : well nourished [No Deformities] : no deformities [General Appearance - In No Acute Distress] : no acute distress [Normal Conjunctiva] : the conjunctiva exhibited no abnormalities [Eyelids - No Xanthelasma] : the eyelids demonstrated no xanthelasmas [Normal Oral Mucosa] : normal oral mucosa [No Oral Pallor] : no oral pallor [No Oral Cyanosis] : no oral cyanosis [Normal Jugular Venous A Waves Present] : normal jugular venous A waves present [Normal Jugular Venous V Waves Present] : normal jugular venous V waves present [No Jugular Venous Matthews A Waves] : no jugular venous matthews A waves [Respiration, Rhythm And Depth] : normal respiratory rhythm and effort [Exaggerated Use Of Accessory Muscles For Inspiration] : no accessory muscle use [Auscultation Breath Sounds / Voice Sounds] : lungs were clear to auscultation bilaterally [Heart Rate And Rhythm] : heart rate and rhythm were normal [Heart Sounds] : normal S1 and S2 [Murmurs] : no murmurs present [Abdomen Soft] : soft [Abdomen Tenderness] : non-tender [Abdomen Mass (___ Cm)] : no abdominal mass palpated [Abnormal Walk] : normal gait [Gait - Sufficient For Exercise Testing] : the gait was sufficient for exercise testing [Nail Clubbing] : no clubbing of the fingernails [Cyanosis, Localized] : no localized cyanosis [Petechial Hemorrhages (___cm)] : no petechial hemorrhages [Skin Color & Pigmentation] : normal skin color and pigmentation [] : no rash [No Venous Stasis] : no venous stasis [Skin Lesions] : no skin lesions [No Skin Ulcers] : no skin ulcer [No Xanthoma] : no  xanthoma was observed [Oriented To Time, Place, And Person] : oriented to person, place, and time [Affect] : the affect was normal [Mood] : the mood was normal [No Anxiety] : not feeling anxious

## 2020-12-11 NOTE — DISCUSSION/SUMMARY
[FreeTextEntry1] : CAD s/p CABG cont ASA\par HTN - PARAMJIT  and I had an extensive discussion regarding his blood pressure management. Patient will continue taking current medications in addition to maintaining a low Na diet, with periodic b/p checks at home.\par HLD PARAMJIT and I discussed his lipid panel and individualized target LDL goal. At this point, will do diet and exercise with anticipation of re-evaluating labs in 3-6 months\par

## 2020-12-11 NOTE — REASON FOR VISIT
[Follow-Up - Clinic] : a clinic follow-up of [Coronary Artery Disease] : coronary artery disease [Hyperlipidemia] : hyperlipidemia [Hypertension] : hypertension [FreeTextEntry1] : This visit was provided via telehealth using real-time 2 way audio visual technology. The patient, PARAMJIT NAJERA, was located at home, [address]\[place] [address] , at the time of the vist. The Doctor, Jori Ugalde MD, [facility], was located at his medical office located at [address] [place] [address], [address] Floor, [place] [address] at the time of the visit. The patient, PARAMJIT NAJREA and the Doctor participated in telehealth encounter. Verbal consent was given on Dec 11, 2020  by the patient, self.\par \par We are discussing medical management of CAD. He has been doing well. However, we are discussing metoprolol, as there are concerns regarding metoprolol and depression issues.

## 2021-01-28 ENCOUNTER — TRANSCRIPTION ENCOUNTER (OUTPATIENT)
Age: 61
End: 2021-01-28

## 2021-04-14 ENCOUNTER — APPOINTMENT (OUTPATIENT)
Dept: HEART AND VASCULAR | Facility: CLINIC | Age: 61
End: 2021-04-14
Payer: COMMERCIAL

## 2021-04-14 ENCOUNTER — NON-APPOINTMENT (OUTPATIENT)
Age: 61
End: 2021-04-14

## 2021-04-14 VITALS
SYSTOLIC BLOOD PRESSURE: 125 MMHG | BODY MASS INDEX: 26.55 KG/M2 | WEIGHT: 196 LBS | TEMPERATURE: 97.2 F | HEART RATE: 84 BPM | HEIGHT: 72 IN | OXYGEN SATURATION: 95 % | DIASTOLIC BLOOD PRESSURE: 81 MMHG

## 2021-04-14 DIAGNOSIS — Z95.1 PRESENCE OF AORTOCORONARY BYPASS GRAFT: ICD-10-CM

## 2021-04-14 PROCEDURE — 93000 ELECTROCARDIOGRAM COMPLETE: CPT

## 2021-04-14 PROCEDURE — 99214 OFFICE O/P EST MOD 30 MIN: CPT

## 2021-04-14 PROCEDURE — 99072 ADDL SUPL MATRL&STAF TM PHE: CPT

## 2021-04-14 RX ORDER — AMLODIPINE BESYLATE 2.5 MG/1
2.5 TABLET ORAL
Qty: 90 | Refills: 3 | Status: COMPLETED | COMMUNITY
End: 2021-04-14

## 2021-04-15 ENCOUNTER — TRANSCRIPTION ENCOUNTER (OUTPATIENT)
Age: 61
End: 2021-04-15

## 2021-04-15 RX ORDER — ATORVASTATIN CALCIUM 80 MG/1
80 TABLET, FILM COATED ORAL
Qty: 1 | Refills: 3 | Status: COMPLETED | COMMUNITY
End: 2021-04-15

## 2021-04-15 NOTE — REASON FOR VISIT
[Follow-Up - Clinic] : a clinic follow-up of [Coronary Artery Disease] : coronary artery disease [CABG Follow-up] : bypass graft [Hyperlipidemia] : hyperlipidemia [Hypertension] : hypertension [FreeTextEntry1] : Anjel is a 60 year old man with history of CABG who comes in for a follow up and re-evaluation. He remarks on occasional discomfort in his chest symptoms noted most at rest and are positional in nature. He remarks on less exercise secondary to lack of motivation. EKG is remarkable for SR and RBBB. Blood pressure is on the lower end of normal. We are discussing lipids from PMD as well.

## 2021-04-15 NOTE — DISCUSSION/SUMMARY
[FreeTextEntry1] : CAD s/p CABG cont ASA and metoprolol\par CP, atypical EKG reviewed, will check echocardiogram provided his insurance company feels that it is a reasonable course of action and deems appropriate to approve.\par HTN will d/c norvasc 2.5 mg QD and re-evaluate\par HLD will reduced statin given the numbers and body aches.

## 2021-04-27 ENCOUNTER — APPOINTMENT (OUTPATIENT)
Dept: UROLOGY | Facility: CLINIC | Age: 61
End: 2021-04-27
Payer: COMMERCIAL

## 2021-04-27 VITALS
DIASTOLIC BLOOD PRESSURE: 79 MMHG | HEART RATE: 79 BPM | HEIGHT: 72 IN | WEIGHT: 194 LBS | OXYGEN SATURATION: 98 % | TEMPERATURE: 97.2 F | BODY MASS INDEX: 26.28 KG/M2 | SYSTOLIC BLOOD PRESSURE: 120 MMHG

## 2021-04-27 PROCEDURE — 99072 ADDL SUPL MATRL&STAF TM PHE: CPT

## 2021-04-27 PROCEDURE — 99213 OFFICE O/P EST LOW 20 MIN: CPT

## 2021-04-27 NOTE — ASSESSMENT
[FreeTextEntry1] : 60 year old flores single male with history of CAD s/p CABG x 5\par presents with:\par 1. erectile dysfunction\par 2. loss of libido\par 3. nocturia\par We discussed evaluation and impact of CAD on physiology and psychology of erectile function\par DIscussed  treatment options including PD 5-I, Intracavernosal therapy, erection vacuum device.\par Instructional materials provided to patient.\par Patient had previously attempt sildenafil on one occasion without good response\par Discussed PD5-I possible side effects, onset of action, duration of action, and food interactions.  Discussed behavioral strategies to optimize efficacy of medication.  Warned re priapism risk and need for immediate intervention if erection lasts more than 2 hours.  Patient instructed to report to an emergency room and explicitly inform staff of his condition and need for treatment.\par Patient was directed to [facility] Web site for additional information regarding condition, management and options\par \par Of note is the fact that patient previously had a T which was reported as normal .  The result was reported to 300.  Discussed MERARY Luo.  300 would warrant testosterone therapy.  Discussed \par We discussed recent data regarding increased risk of coronary plaque size, heart disease\par Will carry out endocrine evaluation\par Nocturia which began when started medication for ADD.  Discussed potential impact.  PVR 60 cc; Poor flow\par Discussed potential cessation.  Patient will review with neuropsychologist.\par \par Plan:\par 1. endocrine evaluation\par 2. sildenafil\par 3. Cessation of medication for ADD\par 4. consider T replacement\par followup post above\par \par \par 11.02.2020 \par patient returns complaining of:\par 1. loss of libido\par we discussed endocrine evaluation \par we discussed impact of T on libido\par we discussed negative impact of erectile dysfunction on sexual function\par \par 2. complaining of poor quality ejaculation\par \par 3. Erectile dysfunction \par we reviewed response to viagra with improved yet insufficient erectile function when utilized as directed. We discussed option of proceeding with diagnostic DUS with potential use of IC as treatment.\par \par 4. urinary symptoms have improved with the cessation of  medication ADD  \par Improved  urinary urgency improved off medication\par ipss 13; did not do flow\par \par We discussed the treatment of urinary symptoms and erectile dysfunction with daily tadalafil. \par We discussed potential for improved libido with improve daily "erectile responsiveness"\par Patient is reluctant to take multiple medication which makes this approach more attractive\par \par flow at next visit\par discussed need for preapproval\par \par 4.27.2021\par returns on tadalafil\par definite improvement in urinary symptoms\par Nocturia x 1\par Erection ; + nocturnal; not interested\par IPSS score 13 (reviewed prior IPSS same score but patient feels much better)\par JONNA 4\par labs reviewed\par PSA had been 2.99 \par repeat PSA 0.8  (2/29/2021)

## 2021-04-27 NOTE — PHYSICAL EXAM
[Urethral Meatus] : meatus normal [Penis Abnormality] : normal circumcised penis [Epididymis] : the epididymides were normal [Testes Mass (___cm)] : there were no testicular masses [Testes Tenderness] : no tenderness of the testes [Prostate Enlargement] : the prostate was not enlarged [Prostate Tenderness] : the prostate was not tender [No Prostate Nodules] : no prostate nodules

## 2021-04-27 NOTE — HISTORY OF PRESENT ILLNESS
[FreeTextEntry1] : 60 year old male\par \par single flores no current partner\par no sexual activity since 6.2019 at time had MI treated with quintuple by pass at [facility] \par recent stress test was normal\par \par currently no desire\par unable to obtain or sustain erection\par able to ejaculate with poor quality erection\par premature ejaculation prior to surgery\par has taken Viagra on one occasion\par no response to \par \par urinary symptoms\par nocturia x 2-3\par urgency \par hesitancy and interrupted stream\par incomplete sensation of emptying\par started on Strattera for ADD started month\par \par 11.02.2020\par \par 4.27.2021\par returns on tadalafil 5 mg daily\par

## 2021-05-13 ENCOUNTER — TRANSCRIPTION ENCOUNTER (OUTPATIENT)
Age: 61
End: 2021-05-13

## 2021-05-20 ENCOUNTER — APPOINTMENT (OUTPATIENT)
Dept: HEART AND VASCULAR | Facility: CLINIC | Age: 61
End: 2021-05-20
Payer: COMMERCIAL

## 2021-05-20 PROCEDURE — 93922 UPR/L XTREMITY ART 2 LEVELS: CPT

## 2021-05-20 PROCEDURE — 93925 LOWER EXTREMITY STUDY: CPT

## 2021-05-20 PROCEDURE — 93306 TTE W/DOPPLER COMPLETE: CPT

## 2021-05-21 ENCOUNTER — APPOINTMENT (OUTPATIENT)
Dept: HEART AND VASCULAR | Facility: CLINIC | Age: 61
End: 2021-05-21
Payer: COMMERCIAL

## 2021-05-21 PROCEDURE — 99442: CPT

## 2021-07-13 ENCOUNTER — TRANSCRIPTION ENCOUNTER (OUTPATIENT)
Age: 61
End: 2021-07-13

## 2021-07-14 ENCOUNTER — APPOINTMENT (OUTPATIENT)
Dept: HEART AND VASCULAR | Facility: CLINIC | Age: 61
End: 2021-07-14
Payer: COMMERCIAL

## 2021-07-14 VITALS
HEART RATE: 71 BPM | HEIGHT: 72 IN | WEIGHT: 195.5 LBS | TEMPERATURE: 98.2 F | DIASTOLIC BLOOD PRESSURE: 71 MMHG | OXYGEN SATURATION: 97 % | SYSTOLIC BLOOD PRESSURE: 117 MMHG | BODY MASS INDEX: 26.48 KG/M2

## 2021-07-14 PROCEDURE — 99214 OFFICE O/P EST MOD 30 MIN: CPT

## 2021-07-14 NOTE — DISCUSSION/SUMMARY
[FreeTextEntry1] : HTN - PARAMJIT  and I had an extensive discussion regarding his blood pressure management. Patient will continue taking current medications in addition to maintaining a low Na diet, with periodic b/p checks at home.\par HLD PARAMJIT and I discussed his lipid panel and individualized target LDL goal. At this point, will do diet and exercise with anticipation of re-evaluating labs in 3-6 months\par CAD cont ASA\par SOB/ CP Inclined towards a conservative follow up in this patient. We had a careful discussion regarding diet and exercise. Will be happy to re-evaluate\par Leg fatigue studies reviewed. Inclined towards a conservative follow up in this patient. We had a careful discussion regarding diet and exercise. Will be happy to re-evaluate.\par

## 2021-07-14 NOTE — REASON FOR VISIT
[Symptom and Test Evaluation] : symptom and test evaluation [Arrhythmia/ECG Abnorrmalities] : arrhythmia/ECG abnormalities [Hyperlipidemia] : hyperlipidemia [Hypertension] : hypertension [FreeTextEntry1] : Anjel is a 60 year old man with history of CABG who comes in for a follow up and re-evaluation. He remarks on occasional discomfort in his chest symptoms noted most at rest and are positional in nature. He remarks on less exercise secondary to lack of motivation. EKG is remarkable for SR and RBBB. Blood pressure is on the lower end of normal. We are discussing lipids from PMD as well.

## 2021-09-29 ENCOUNTER — RX RENEWAL (OUTPATIENT)
Age: 61
End: 2021-09-29

## 2021-10-06 ENCOUNTER — TRANSCRIPTION ENCOUNTER (OUTPATIENT)
Age: 61
End: 2021-10-06

## 2021-10-29 ENCOUNTER — NON-APPOINTMENT (OUTPATIENT)
Age: 61
End: 2021-10-29

## 2021-11-02 ENCOUNTER — APPOINTMENT (OUTPATIENT)
Dept: HEART AND VASCULAR | Facility: CLINIC | Age: 61
End: 2021-11-02
Payer: COMMERCIAL

## 2021-11-02 VITALS
HEART RATE: 85 BPM | SYSTOLIC BLOOD PRESSURE: 151 MMHG | DIASTOLIC BLOOD PRESSURE: 83 MMHG | WEIGHT: 196 LBS | OXYGEN SATURATION: 97 % | HEIGHT: 72 IN | BODY MASS INDEX: 26.55 KG/M2

## 2021-11-02 DIAGNOSIS — Z87.438 PERSONAL HISTORY OF OTHER DISEASES OF MALE GENITAL ORGANS: ICD-10-CM

## 2021-11-02 PROCEDURE — 99214 OFFICE O/P EST MOD 30 MIN: CPT

## 2021-11-02 NOTE — DISCUSSION/SUMMARY
[FreeTextEntry1] : HTN - PARAMJIT  and I had an extensive discussion regarding his blood pressure management. Patient will continue taking current medications in addition to maintaining a low Na diet, with periodic b/p checks at home. Inc norvasc to 5 mg QD \par HLD PARAMJIT and I discussed his lipid panel and individualized target LDL goal. At this point, will do diet and exercise with anticipation of re-evaluating labs in 3-6 months\par Carotid disease minor disease noted on exam; will manage medically with current medications and re-evaluate lipids in 3-6 months\par CAD cont ASA\par also advised a visit with PMD

## 2021-11-02 NOTE — REASON FOR VISIT
[Symptom and Test Evaluation] : symptom and test evaluation [FreeTextEntry1] : Anjel is a 60 year old man with history of CABG who comes in for a follow up and re-evaluation. He remarks on occasional discomfort in his chest symptoms noted most at rest and are positional in nature. He remarks on less exercise secondary to lack of motivation. Blood pressure was a bit elevated at home and he restarted his norvasc.

## 2022-02-04 ENCOUNTER — RX RENEWAL (OUTPATIENT)
Age: 62
End: 2022-02-04

## 2022-06-10 NOTE — ED PROVIDER NOTE - NS_ATTENDINGSCRIBE_ED_ALL_ED
no pain, swelling or deformity of joints I personally performed the service described in the documentation recorded by the scribe in my presence, and it accurately and completely records my words and actions.

## 2022-06-19 ENCOUNTER — TRANSCRIPTION ENCOUNTER (OUTPATIENT)
Age: 62
End: 2022-06-19

## 2022-06-20 ENCOUNTER — NON-APPOINTMENT (OUTPATIENT)
Age: 62
End: 2022-06-20

## 2022-06-23 ENCOUNTER — APPOINTMENT (OUTPATIENT)
Dept: UROLOGY | Facility: CLINIC | Age: 62
End: 2022-06-23

## 2022-06-23 DIAGNOSIS — Z78.9 OTHER SPECIFIED HEALTH STATUS: ICD-10-CM

## 2022-06-23 PROCEDURE — 51798 US URINE CAPACITY MEASURE: CPT

## 2022-06-23 PROCEDURE — 99214 OFFICE O/P EST MOD 30 MIN: CPT

## 2022-06-23 PROCEDURE — 51741 ELECTRO-UROFLOWMETRY FIRST: CPT

## 2022-06-23 NOTE — PHYSICAL EXAM
[General Appearance - Well Developed] : well developed [General Appearance - Well Nourished] : well nourished [Normal Appearance] : normal appearance [Well Groomed] : well groomed [General Appearance - In No Acute Distress] : no acute distress [Edema] : no peripheral edema [Respiration, Rhythm And Depth] : normal respiratory rhythm and effort [Exaggerated Use Of Accessory Muscles For Inspiration] : no accessory muscle use [Abdomen Soft] : soft [Abdomen Tenderness] : non-tender [Costovertebral Angle Tenderness] : no ~M costovertebral angle tenderness [Penis Abnormality] : normal circumcised penis [Scrotum] : the scrotum was normal [Epididymis] : the epididymides were normal [Testes Tenderness] : no tenderness of the testes [Testes Mass (___cm)] : there were no testicular masses [Prostate Tenderness] : the prostate was not tender [No Prostate Nodules] : no prostate nodules [Prostate Size ___ (0-4)] : prostate size [unfilled] (scale: 0-4) [Normal Station and Gait] : the gait and station were normal for the patient's age [] : no rash [No Focal Deficits] : no focal deficits [Oriented To Time, Place, And Person] : oriented to person, place, and time [Affect] : the affect was normal [Mood] : the mood was normal [Not Anxious] : not anxious [No Palpable Adenopathy] : no palpable adenopathy

## 2022-06-24 PROBLEM — Z78.9 CAFFEINE USE: Status: ACTIVE | Noted: 2022-06-23

## 2022-06-24 NOTE — ASSESSMENT
[FreeTextEntry1] : 61 yr old male with PMH of htn, hld, depression presents with elevated PSA, BPH with high residuals, ED.\par \par Elevated PSA\par - 4K score today \par - If 4K is unfavorable, we will order a prostate MRI.\par - Discussed need for prostate biopsy only if the 4K is unfavorable and MRI is suspicious for cancer. We explained his PSA elevation is likely due to BPH, but cancer cannot be ruled out until we complete testing. \par \par BPH with elevated PVR\par -  today\par - Add alfuzosin 10mg daily. Discussed goals and SEs of medication \par - Repeat PVR, flow in 3 months \par \par ED\par - continue tadalafil 5mg daily \par - Add sildenafil 20 mg 2-4 tablets PRN \par \par Low testosterone\par - Continue testosterone injections as per outside provider \par \par RTC in 3 months (flow, PVR) pending 4K results. If unfavorable, we will see him in the office sooner.

## 2022-06-24 NOTE — LETTER BODY
[Dear  ___] : Dear  [unfilled], [Consult Letter:] : I had the pleasure of evaluating your patient, [unfilled]. [Please see my note below.] : Please see my note below. [Consult Closing:] : Thank you very much for allowing me to participate in the care of this patient.  If you have any questions, please do not hesitate to contact me. [Sincerely,] : Sincerely, [FreeTextEntry3] : Blake Wong MD\par

## 2022-06-24 NOTE — REVIEW OF SYSTEMS
[Wake up at night to urinate  How many times?  ___] : wakes up to urinate [unfilled] times during the night [Strong urge to urinate] : strong urge to urinate [Wait a long time to urinate] : waits a long time to urinate [Slow urine stream] : slow urine stream [Leakage of urine with urgency] : leakage of urine with urgency [Negative] : Heme/Lymph [Nocturia] : nocturia

## 2022-06-24 NOTE — HISTORY OF PRESENT ILLNESS
[FreeTextEntry1] : 61 yr old male with CC of elevated PSA, BPH with LUTS, and ED. On 22 PSA was 5.0ng/ml. He has no family history of prostate cancer. He has hx of BPH with frequency, slow stream, dribbling, urgency, urge incontinence. Denies dysuria, gross hematuria. He is on tadalafil 5 mg daily which helped his urination. Also history of ED with difficulty obtaining and maintaining erections. He rarely can achieve an erection adequate for penetration. He does wake with erections. History of low testosterone which is treated with weekly testosterone injections for the last year. \par PSA \par 10/19/2020: 2.99ng/ml\par 22: 4.07ng/ml\par 22: 5.00ng/ml \par \par IPSS:19\par QOL: 4\par \par Uroflow\par Max: 4.4 ml/s\par Av.1 ml/s\par Volume: 75 ml\par PVR: 186 ml \par \par \par \par PMH: HTN, HLD, depression\par PSH: heart surgery \par FH: noncontributory \par Meds: Cialis, Abilify, amlodipine, atorvastatin, metoprolol, testosterone injections \par Allergies: NKDA\par Social: denies smoking, alcohol, recreation drugs \par \par

## 2022-06-28 LAB
4K SCORE CALCULATION: 6.4
FREE PSA: 1.12 NG/ML
PERCENT FREE PSA: 20 %
TOTAL PSA: 5.67 NG/ML

## 2022-07-08 ENCOUNTER — NON-APPOINTMENT (OUTPATIENT)
Age: 62
End: 2022-07-08

## 2022-07-15 ENCOUNTER — NON-APPOINTMENT (OUTPATIENT)
Age: 62
End: 2022-07-15

## 2022-09-29 ENCOUNTER — APPOINTMENT (OUTPATIENT)
Dept: UROLOGY | Facility: CLINIC | Age: 62
End: 2022-09-29

## 2022-09-29 VITALS
OXYGEN SATURATION: 96 % | BODY MASS INDEX: 26.55 KG/M2 | DIASTOLIC BLOOD PRESSURE: 72 MMHG | HEIGHT: 72 IN | RESPIRATION RATE: 18 BRPM | TEMPERATURE: 95.7 F | SYSTOLIC BLOOD PRESSURE: 118 MMHG | WEIGHT: 196 LBS | HEART RATE: 69 BPM

## 2022-09-29 PROCEDURE — 99214 OFFICE O/P EST MOD 30 MIN: CPT

## 2022-09-29 PROCEDURE — 51741 ELECTRO-UROFLOWMETRY FIRST: CPT

## 2022-09-29 PROCEDURE — 51798 US URINE CAPACITY MEASURE: CPT

## 2022-09-29 RX ORDER — SILDENAFIL 20 MG/1
20 TABLET ORAL
Qty: 30 | Refills: 5 | Status: COMPLETED | COMMUNITY
Start: 2022-06-24 | End: 2022-09-29

## 2022-09-30 NOTE — LETTER BODY
[Dear  ___] : Dear  [unfilled], [Courtesy Letter:] : I had the pleasure of seeing your patient, [unfilled], in my office today. [Please see my note below.] : Please see my note below. [Consult Closing:] : Thank you very much for allowing me to participate in the care of this patient.  If you have any questions, please do not hesitate to contact me. [Sincerely,] : Sincerely, [FreeTextEntry3] : Blake Wong MD

## 2022-09-30 NOTE — PHYSICAL EXAM
[General Appearance - Well Developed] : well developed [General Appearance - Well Nourished] : well nourished [Normal Appearance] : normal appearance [Well Groomed] : well groomed [General Appearance - In No Acute Distress] : no acute distress [Abdomen Soft] : soft [Abdomen Tenderness] : non-tender [Costovertebral Angle Tenderness] : no ~M costovertebral angle tenderness [Edema] : no peripheral edema [] : no respiratory distress [Respiration, Rhythm And Depth] : normal respiratory rhythm and effort [Exaggerated Use Of Accessory Muscles For Inspiration] : no accessory muscle use [Oriented To Time, Place, And Person] : oriented to person, place, and time [Affect] : the affect was normal [Mood] : the mood was normal [Not Anxious] : not anxious [Normal Station and Gait] : the gait and station were normal for the patient's age [No Focal Deficits] : no focal deficits [Urethral Meatus] : meatus normal [Penis Abnormality] : normal circumcised penis [Urinary Bladder Findings] : the bladder was normal on palpation [Scrotum] : the scrotum was normal [Testes Mass (___cm)] : there were no testicular masses [No Palpable Adenopathy] : no palpable adenopathy

## 2022-09-30 NOTE — REVIEW OF SYSTEMS
[Wake up at night to urinate  How many times?  ___] : wakes up to urinate [unfilled] times during the night [Slow urine stream] : slow urine stream [Nocturia] : nocturia [Urine Infection (bladder/kidney)] : denies bladder/kidney infections [Pain during urination] : denies pain during urination [Pain at onset of urination] : denies pain during onset of urination [Pain after urination] : denies pain after urination [Blood in urine that you can see] : denies seeing blood in urine [Told you have blood in urine on a urine test] : denies being told that blood was present in a urine test [Discharge from urine canal] : denies discharge from urine canal [History of kidney stones] : denies history of kidney stones [Urine retention] : denies urine retention [Strong urge to urinate] : strong urge to urinate [Bladder pressure] : denies bladder pressure [Strain or push to urinate] : denies straining or pushing to urinate [Wait a long time to urinate] : waits a long time to urinate [Interrupted urine stream] : denies interrupted urine stream [Bladder fullness after urinating] : denies bladder fullness after urinating [Increased pain/discomfort with bladder filling] : denies increased pain/discomfort with bladder filling [Bladder problems as child. If yes, describe..] : denies bladder problems as child [Leakage of urine with urgency] : leakage of urine with urgency [Leakage of urine with straining, coughing, laughing] : denies leakage of urine with straining, coughing, and/or laughing [Unaware of when urine is leaking] : aware of when urine is leaking [Negative] : Genitourinary

## 2022-09-30 NOTE — ASSESSMENT
[FreeTextEntry1] : The patient has improved subjectively and mildly objectively. His PVR is slightly improved. He will continue on the alfuzosin and daily tadalafil for BPH and LUTS. We discussed adding finasteride, but we will hold off for now and reassess in 4 months. We renewed his alfuzosin, tadalafil, and sildenafil and he should call if he does not receive any of the prescriptions. \par \par RTC 4 months

## 2022-09-30 NOTE — HISTORY OF PRESENT ILLNESS
[FreeTextEntry1] : Patient reports improved urinary symptoms since starting the alfuzosin. He feels better emptying and nocturia decreased to x2. He is on alfuzosin and daily tadalafil. He stopped his testosterone injections for now and he has decreased libido. Otherwise his erections are adequate with daily tadalafil. He states he never received the sildenafil. \par His prostate MRI revealed a 71 gram prostate with no suspicious lesions. \par \par Uroflow\par Max: 6.8 ml/s\par Av.6 m/s\par Vol: 18 ml\par PVR: 152\par \par IPSS: 11

## 2022-10-06 ENCOUNTER — TRANSCRIPTION ENCOUNTER (OUTPATIENT)
Age: 62
End: 2022-10-06

## 2023-01-20 ENCOUNTER — APPOINTMENT (OUTPATIENT)
Dept: UROLOGY | Facility: CLINIC | Age: 63
End: 2023-01-20
Payer: COMMERCIAL

## 2023-01-20 VITALS
SYSTOLIC BLOOD PRESSURE: 137 MMHG | HEIGHT: 72 IN | RESPIRATION RATE: 18 BRPM | DIASTOLIC BLOOD PRESSURE: 80 MMHG | WEIGHT: 196 LBS | BODY MASS INDEX: 26.55 KG/M2 | HEART RATE: 65 BPM | OXYGEN SATURATION: 96 %

## 2023-01-20 PROCEDURE — 51741 ELECTRO-UROFLOWMETRY FIRST: CPT

## 2023-01-20 PROCEDURE — 51798 US URINE CAPACITY MEASURE: CPT

## 2023-01-20 PROCEDURE — 99214 OFFICE O/P EST MOD 30 MIN: CPT

## 2023-01-20 NOTE — PHYSICAL EXAM
[General Appearance - Well Developed] : well developed [General Appearance - Well Nourished] : well nourished [Normal Appearance] : normal appearance [Well Groomed] : well groomed [General Appearance - In No Acute Distress] : no acute distress [Abdomen Soft] : soft [Abdomen Tenderness] : non-tender [Costovertebral Angle Tenderness] : no ~M costovertebral angle tenderness [Urethral Meatus] : meatus normal [Penis Abnormality] : normal circumcised penis [Urinary Bladder Findings] : the bladder was normal on palpation [Scrotum] : the scrotum was normal [Testes Mass (___cm)] : there were no testicular masses [Edema] : no peripheral edema [] : no respiratory distress [Respiration, Rhythm And Depth] : normal respiratory rhythm and effort [Exaggerated Use Of Accessory Muscles For Inspiration] : no accessory muscle use [Oriented To Time, Place, And Person] : oriented to person, place, and time [Affect] : the affect was normal [Mood] : the mood was normal [Not Anxious] : not anxious [Normal Station and Gait] : the gait and station were normal for the patient's age [No Focal Deficits] : no focal deficits [No Palpable Adenopathy] : no palpable adenopathy [Prostate Tenderness] : the prostate was not tender [No Prostate Nodules] : no prostate nodules [Prostate Size ___ (0-4)] : prostate size [unfilled] (scale: 0-4)

## 2023-01-22 ENCOUNTER — NON-APPOINTMENT (OUTPATIENT)
Age: 63
End: 2023-01-22

## 2023-01-22 LAB
PSA FREE FLD-MCNC: 22 %
PSA FREE SERPL-MCNC: 1.06 NG/ML
PSA SERPL-MCNC: 4.84 NG/ML

## 2023-01-22 NOTE — ADDENDUM
[FreeTextEntry1] : next visit: PVR, Uroflow, PSA/4K\Abimbola Amadorfo completed this note as a scribe on behalf of Dr. Blake Wong M.D.

## 2023-01-22 NOTE — HISTORY OF PRESENT ILLNESS
[FreeTextEntry1] : Patient presents with complaints of loss of libido, some increase in urgency and weak stream. The patient attributes loss of libido to no longer taking an anti-depressant as of summer of 2022. He is unsure of the adequacy of his erections. The patient's MRI on 07/13/2022 revealed a 70cc prostate. His PSA on 02/28/2022 was 4.07ng/mL; his 4K score on 06/28/2022 was 6.4%. The patient continues to take tadalafil and alfuzosin daily, he is not taking sildenafil. His daily fluid intake is adequate.  He has nocturia x2 and denies hematuria or dysuria. \par \par

## 2023-01-22 NOTE — REVIEW OF SYSTEMS
[Wake up at night to urinate  How many times?  ___] : wakes up to urinate [unfilled] times during the night [Negative] : Heme/Lymph [Nocturia] : nocturia [Strong urge to urinate] : strong urge to urinate [Wait a long time to urinate] : waits a long time to urinate [Slow urine stream] : slow urine stream [Leakage of urine with urgency] : leakage of urine with urgency [Urine Infection (bladder/kidney)] : denies bladder/kidney infections [Pain during urination] : denies pain during urination [Pain at onset of urination] : denies pain during onset of urination [Pain after urination] : denies pain after urination [Blood in urine that you can see] : denies seeing blood in urine [Told you have blood in urine on a urine test] : denies being told that blood was present in a urine test [Discharge from urine canal] : denies discharge from urine canal [History of kidney stones] : denies history of kidney stones [Urine retention] : denies urine retention [Bladder pressure] : denies bladder pressure [Strain or push to urinate] : denies straining or pushing to urinate [Interrupted urine stream] : denies interrupted urine stream [Bladder fullness after urinating] : denies bladder fullness after urinating [Increased pain/discomfort with bladder filling] : denies increased pain/discomfort with bladder filling [Bladder problems as child. If yes, describe..] : denies bladder problems as child [Leakage of urine with straining, coughing, laughing] : denies leakage of urine with straining, coughing, and/or laughing [Unaware of when urine is leaking] : aware of when urine is leaking

## 2023-01-22 NOTE — ASSESSMENT
[FreeTextEntry1] : The patient has moderate LUTS and still as significant postvoid residual. He will continue on the alfuzosin and daily tadalafil and will start him on finasteride 5mg daily. Today we sent his serum for PSA and testosterone determination.  We will reevaluate him again in 3 months; if his voiding parameters and bladder emptying are not improved we will consider definitive treatment for his BPH at that time.\par \par

## 2023-01-25 LAB
TESTOST FREE SERPL-MCNC: 22.4 PG/ML
TESTOST SERPL-MCNC: 785 NG/DL

## 2023-02-27 ENCOUNTER — RX RENEWAL (OUTPATIENT)
Age: 63
End: 2023-02-27

## 2023-02-27 ENCOUNTER — NON-APPOINTMENT (OUTPATIENT)
Age: 63
End: 2023-02-27

## 2023-03-10 ENCOUNTER — APPOINTMENT (OUTPATIENT)
Dept: HEART AND VASCULAR | Facility: CLINIC | Age: 63
End: 2023-03-10
Payer: COMMERCIAL

## 2023-03-10 ENCOUNTER — NON-APPOINTMENT (OUTPATIENT)
Age: 63
End: 2023-03-10

## 2023-03-10 VITALS
WEIGHT: 215.5 LBS | SYSTOLIC BLOOD PRESSURE: 132 MMHG | TEMPERATURE: 96.9 F | BODY MASS INDEX: 29.19 KG/M2 | DIASTOLIC BLOOD PRESSURE: 90 MMHG | HEIGHT: 72 IN | OXYGEN SATURATION: 95 % | HEART RATE: 87 BPM

## 2023-03-10 PROCEDURE — 99214 OFFICE O/P EST MOD 30 MIN: CPT | Mod: 25

## 2023-03-10 PROCEDURE — 93000 ELECTROCARDIOGRAM COMPLETE: CPT

## 2023-03-10 RX ORDER — SILDENAFIL 20 MG/1
20 TABLET ORAL
Qty: 90 | Refills: 0 | Status: COMPLETED | COMMUNITY
Start: 2022-06-23 | End: 2023-03-10

## 2023-03-10 NOTE — DISCUSSION/SUMMARY
[FreeTextEntry1] : SOB/CP/fatigue check echo and carotid if able to approve and schedule.\par HTN - PARAMJIT  and I had an extensive discussion regarding his blood pressure management. Patient will continue taking current medications in addition to maintaining a low Na diet, with periodic b/p checks at home.\par HLD PARAMJIT and I discussed his lipid panel and individualized target LDL goal. At this point, will do diet and exercise with anticipation of re-evaluating labs in 3-6 months\par CAD cont \par refill sent for b/p and lipid meds\par EKG section of the chart --- secondary to symptoms above an electrocardiogram also known as an EKG was performed.  Risks and benefits discussed with the patient. Patient was given time and privacy to changed into a gown. Shortly after, standard 10 leads were applied and a [facility] system was used to perform the study. The results were subsequently reviewed by attending physician and discussed with the patient. The study showed a normal sinus rhythm and no ST-T suggestive of ischemia. Order for the EKG was placed in the chart. The results were documented. Billing submitted. Emotional support provided.\par  [EKG obtained to assist in diagnosis and management of assessed problem(s)] : EKG obtained to assist in diagnosis and management of assessed problem(s)

## 2023-05-12 ENCOUNTER — APPOINTMENT (OUTPATIENT)
Dept: UROLOGY | Facility: CLINIC | Age: 63
End: 2023-05-12
Payer: COMMERCIAL

## 2023-05-12 VITALS
TEMPERATURE: 98 F | WEIGHT: 215 LBS | HEIGHT: 72 IN | DIASTOLIC BLOOD PRESSURE: 84 MMHG | BODY MASS INDEX: 29.12 KG/M2 | SYSTOLIC BLOOD PRESSURE: 136 MMHG | HEART RATE: 89 BPM

## 2023-05-12 DIAGNOSIS — R35.1 NOCTURIA: ICD-10-CM

## 2023-05-12 PROCEDURE — 51741 ELECTRO-UROFLOWMETRY FIRST: CPT

## 2023-05-12 PROCEDURE — 99214 OFFICE O/P EST MOD 30 MIN: CPT | Mod: 25

## 2023-05-12 PROCEDURE — 51798 US URINE CAPACITY MEASURE: CPT

## 2023-05-12 NOTE — PHYSICAL EXAM
[General Appearance - Well Developed] : well developed [General Appearance - Well Nourished] : well nourished [Normal Appearance] : normal appearance [Well Groomed] : well groomed [General Appearance - In No Acute Distress] : no acute distress [Abdomen Soft] : soft [Abdomen Tenderness] : non-tender [Costovertebral Angle Tenderness] : no ~M costovertebral angle tenderness [Urethral Meatus] : meatus normal [Penis Abnormality] : normal circumcised penis [Testes Tenderness] : no tenderness of the testes [Testes Mass (___cm)] : there were no testicular masses [Prostate Tenderness] : the prostate was not tender [No Prostate Nodules] : no prostate nodules [Prostate Size ___ (0-4)] : prostate size [unfilled] (scale: 0-4) [Edema] : no peripheral edema [] : no respiratory distress [Respiration, Rhythm And Depth] : normal respiratory rhythm and effort [Exaggerated Use Of Accessory Muscles For Inspiration] : no accessory muscle use [Oriented To Time, Place, And Person] : oriented to person, place, and time [Affect] : the affect was normal [Mood] : the mood was normal [Not Anxious] : not anxious [Normal Station and Gait] : the gait and station were normal for the patient's age [No Focal Deficits] : no focal deficits

## 2023-05-12 NOTE — HISTORY OF PRESENT ILLNESS
[FreeTextEntry1] : Patient has no significant change in his urinary symptoms. He still has intermittent and weak stream. He is continuing on tadalafil, alfuzosin, and finasteride. He still states low libido, weak erections. His T was normal last visit. He is omn weekly testosterone replacement.  PSA in January was elevated to 5.67 ng milliliter but the 4K score was favorable at 6.4%.\par \par

## 2023-05-12 NOTE — REVIEW OF SYSTEMS
[Wake up at night to urinate  How many times?  ___] : wakes up to urinate [unfilled] times during the night [Slow urine stream] : slow urine stream [Negative] : Heme/Lymph [Nocturia] : nocturia [Urine Infection (bladder/kidney)] : denies bladder/kidney infections [Pain during urination] : denies pain during urination [Pain at onset of urination] : denies pain during onset of urination [Pain after urination] : denies pain after urination [Blood in urine that you can see] : denies seeing blood in urine [Told you have blood in urine on a urine test] : denies being told that blood was present in a urine test [Discharge from urine canal] : denies discharge from urine canal [History of kidney stones] : denies history of kidney stones [Urine retention] : denies urine retention [Strong urge to urinate] : strong urge to urinate [Bladder pressure] : denies bladder pressure [Strain or push to urinate] : denies straining or pushing to urinate [Wait a long time to urinate] : waits a long time to urinate [Interrupted urine stream] : denies interrupted urine stream [Bladder fullness after urinating] : denies bladder fullness after urinating [Increased pain/discomfort with bladder filling] : denies increased pain/discomfort with bladder filling [Bladder problems as child. If yes, describe..] : denies bladder problems as child [Leakage of urine with urgency] : leakage of urine with urgency [Leakage of urine with straining, coughing, laughing] : denies leakage of urine with straining, coughing, and/or laughing [Unaware of when urine is leaking] : aware of when urine is leaking

## 2023-05-12 NOTE — ASSESSMENT
[FreeTextEntry1] : Patient is stable clinically. His BPH and LUTS are controlled but we briefly discussed definitive treatment for the prostate, specifically Aquablation, if his symptoms worsen. For now, we will continue on the alfuzosin and finasteride. For his ED, he can continue the tadalafil 5 mg and take sildenafil 50- 100mg PRN prior to sexual activity.  If all remains stable, he will follow up in 6 months.

## 2023-05-24 ENCOUNTER — APPOINTMENT (OUTPATIENT)
Dept: HEART AND VASCULAR | Facility: CLINIC | Age: 63
End: 2023-05-24
Payer: COMMERCIAL

## 2023-05-24 VITALS — DIASTOLIC BLOOD PRESSURE: 72 MMHG | SYSTOLIC BLOOD PRESSURE: 115 MMHG | OXYGEN SATURATION: 96 % | HEART RATE: 72 BPM

## 2023-05-24 DIAGNOSIS — M62.89 OTHER SPECIFIED DISORDERS OF MUSCLE: ICD-10-CM

## 2023-05-24 PROCEDURE — 99214 OFFICE O/P EST MOD 30 MIN: CPT

## 2023-05-24 PROCEDURE — 93306 TTE W/DOPPLER COMPLETE: CPT | Mod: 26

## 2023-05-24 PROCEDURE — 93880 EXTRACRANIAL BILAT STUDY: CPT

## 2023-05-24 NOTE — REASON FOR VISIT
[Symptom and Test Evaluation] : symptom and test evaluation [FreeTextEntry1] : 62 year old man with history of CAD s/p CABG presents for a follow up. He completed an echo and a carotid u/s. We are discussing medical management of CAD, HTN, HLD. As to symptoms, he still feels tired and gained a few lbs. Medical Assistant did not obtain weight on this follow up.

## 2023-05-24 NOTE — DISCUSSION/SUMMARY
[FreeTextEntry1] : Fatigue echo reviewed. Conservative follow up for now\par Dizziness/carotid disease carotid reviewed. no pathology noted\par HTN - PARAMJIT  and I had an extensive discussion regarding his blood pressure management. Patient will continue taking current medications in addition to maintaining a low Na diet, with periodic b/p checks at home.\par HLD PARAMJIT and I discussed his lipid panel and individualized target LDL goal. At this point, will do diet and exercise with anticipation of re-evaluating labs in 3-6 months\par CAD cont ASA\par

## 2023-11-10 ENCOUNTER — APPOINTMENT (OUTPATIENT)
Dept: UROLOGY | Facility: CLINIC | Age: 63
End: 2023-11-10
Payer: COMMERCIAL

## 2023-11-10 VITALS
HEIGHT: 72 IN | OXYGEN SATURATION: 97 % | BODY MASS INDEX: 29.12 KG/M2 | HEART RATE: 69 BPM | WEIGHT: 215 LBS | RESPIRATION RATE: 16 BRPM | TEMPERATURE: 97.7 F | DIASTOLIC BLOOD PRESSURE: 71 MMHG | SYSTOLIC BLOOD PRESSURE: 120 MMHG

## 2023-11-10 DIAGNOSIS — R39.12 POOR URINARY STREAM: ICD-10-CM

## 2023-11-10 PROCEDURE — 51741 ELECTRO-UROFLOWMETRY FIRST: CPT

## 2023-11-10 PROCEDURE — 99214 OFFICE O/P EST MOD 30 MIN: CPT

## 2023-11-10 PROCEDURE — 51798 US URINE CAPACITY MEASURE: CPT

## 2023-11-17 LAB
4K SCORE CALCULATION: 7
FREE PSA: 0.57 NG/ML
PERCENT FREE PSA: 17 %
TOTAL PSA: 3.35 NG/ML

## 2023-11-29 ENCOUNTER — NON-APPOINTMENT (OUTPATIENT)
Age: 63
End: 2023-11-29

## 2023-11-29 ENCOUNTER — APPOINTMENT (OUTPATIENT)
Dept: HEART AND VASCULAR | Facility: CLINIC | Age: 63
End: 2023-11-29
Payer: COMMERCIAL

## 2023-11-29 VITALS
SYSTOLIC BLOOD PRESSURE: 130 MMHG | DIASTOLIC BLOOD PRESSURE: 75 MMHG | OXYGEN SATURATION: 95 % | BODY MASS INDEX: 29.12 KG/M2 | WEIGHT: 215 LBS | HEIGHT: 72 IN | TEMPERATURE: 98 F | HEART RATE: 91 BPM

## 2023-11-29 PROCEDURE — 99214 OFFICE O/P EST MOD 30 MIN: CPT

## 2023-11-29 RX ORDER — ASPIRIN 81 MG
81 TABLET, DELAYED RELEASE (ENTERIC COATED) ORAL DAILY
Qty: 90 | Refills: 3 | Status: ACTIVE | COMMUNITY
Start: 1900-01-01 | End: 1900-01-01

## 2024-02-23 ENCOUNTER — TRANSCRIPTION ENCOUNTER (OUTPATIENT)
Age: 64
End: 2024-02-23

## 2024-02-25 ENCOUNTER — TRANSCRIPTION ENCOUNTER (OUTPATIENT)
Age: 64
End: 2024-02-25

## 2024-02-25 RX ORDER — FINASTERIDE 5 MG/1
5 TABLET, FILM COATED ORAL DAILY
Qty: 90 | Refills: 3 | Status: ACTIVE | COMMUNITY
Start: 2023-01-20 | End: 1900-01-01

## 2024-02-25 RX ORDER — METOPROLOL SUCCINATE 25 MG/1
25 TABLET, EXTENDED RELEASE ORAL DAILY
Qty: 90 | Refills: 3 | Status: ACTIVE | COMMUNITY
Start: 2019-12-11 | End: 1900-01-01

## 2024-02-26 ENCOUNTER — TRANSCRIPTION ENCOUNTER (OUTPATIENT)
Age: 64
End: 2024-02-26

## 2024-02-26 RX ORDER — TADALAFIL 5 MG/1
5 TABLET ORAL
Qty: 90 | Refills: 3 | Status: ACTIVE | COMMUNITY
Start: 2020-11-02 | End: 1900-01-01

## 2024-02-26 RX ORDER — ALFUZOSIN HYDROCHLORIDE 10 MG/1
10 TABLET, EXTENDED RELEASE ORAL
Qty: 90 | Refills: 3 | Status: ACTIVE | COMMUNITY
Start: 2022-06-23 | End: 1900-01-01

## 2024-04-12 ENCOUNTER — NON-APPOINTMENT (OUTPATIENT)
Age: 64
End: 2024-04-12

## 2024-04-17 ENCOUNTER — RX RENEWAL (OUTPATIENT)
Age: 64
End: 2024-04-17

## 2024-04-17 RX ORDER — ATORVASTATIN CALCIUM 40 MG/1
40 TABLET, FILM COATED ORAL
Qty: 90 | Refills: 3 | Status: ACTIVE | COMMUNITY
Start: 2021-04-14 | End: 1900-01-01

## 2024-04-17 RX ORDER — AMLODIPINE BESYLATE 5 MG/1
5 TABLET ORAL DAILY
Qty: 90 | Refills: 3 | Status: ACTIVE | COMMUNITY
Start: 2021-11-02 | End: 1900-01-01

## 2024-05-10 ENCOUNTER — APPOINTMENT (OUTPATIENT)
Dept: UROLOGY | Facility: CLINIC | Age: 64
End: 2024-05-10
Payer: COMMERCIAL

## 2024-05-10 VITALS
BODY MASS INDEX: 28.44 KG/M2 | TEMPERATURE: 98.2 F | WEIGHT: 210 LBS | HEART RATE: 97 BPM | OXYGEN SATURATION: 96 % | HEIGHT: 72 IN | SYSTOLIC BLOOD PRESSURE: 127 MMHG | DIASTOLIC BLOOD PRESSURE: 80 MMHG

## 2024-05-10 DIAGNOSIS — R68.82 DECREASED LIBIDO: ICD-10-CM

## 2024-05-10 DIAGNOSIS — F52.4 PREMATURE EJACULATION: ICD-10-CM

## 2024-05-10 DIAGNOSIS — N52.9 MALE ERECTILE DYSFUNCTION, UNSPECIFIED: ICD-10-CM

## 2024-05-10 DIAGNOSIS — R39.14 BENIGN PROSTATIC HYPERPLASIA WITH LOWER URINARY TRACT SYMPMS: ICD-10-CM

## 2024-05-10 DIAGNOSIS — N40.1 BENIGN PROSTATIC HYPERPLASIA WITH LOWER URINARY TRACT SYMPMS: ICD-10-CM

## 2024-05-10 DIAGNOSIS — R97.20 ELEVATED PROSTATE, SPECIFIC ANTIGEN [PSA]: ICD-10-CM

## 2024-05-10 PROCEDURE — 51798 US URINE CAPACITY MEASURE: CPT

## 2024-05-10 PROCEDURE — 99214 OFFICE O/P EST MOD 30 MIN: CPT

## 2024-05-10 NOTE — PHYSICAL EXAM
[Normal Appearance] : normal appearance [Well Groomed] : well groomed [General Appearance - In No Acute Distress] : no acute distress [Edema] : no peripheral edema [Respiration, Rhythm And Depth] : normal respiratory rhythm and effort [Exaggerated Use Of Accessory Muscles For Inspiration] : no accessory muscle use [Abdomen Soft] : soft [Abdomen Tenderness] : non-tender [Costovertebral Angle Tenderness] : no ~M costovertebral angle tenderness [Urinary Bladder Findings] : the bladder was normal on palpation [Normal Station and Gait] : the gait and station were normal for the patient's age [] : no rash [No Focal Deficits] : no focal deficits [Oriented To Time, Place, And Person] : oriented to person, place, and time [Affect] : the affect was normal [Mood] : the mood was normal [No Palpable Adenopathy] : no palpable adenopathy [Prostate Tenderness] : the prostate was not tender [No Prostate Nodules] : no prostate nodules [Prostate Size ___ (0-4)] : prostate size [unfilled] (scale: 0-4) [Urethral Meatus] : meatus normal [Penis Abnormality] : normal circumcised penis [Scrotum] : the scrotum was normal

## 2024-05-10 NOTE — ADDENDUM
[FreeTextEntry1] : Next Visit: PVR, Uroflow  Entered by Ria Styles, acting as scribe for Dr. Blake Wong.   The documentation recorded by the scribe accurately reflects the service I personally performed and the decisions made by me.

## 2024-05-10 NOTE — ASSESSMENT
[FreeTextEntry1] : Patient is stable clinically; He is voiding adequately with mild post void residual.. We will start him on sildenafil  50-100mg as needed in addition to daily tadalafil. He will continue with daily alfuzosin and finasteride. If all remains stable, he will follow-up in 6 months.

## 2024-05-10 NOTE — HISTORY OF PRESENT ILLNESS
[FreeTextEntry1] : Patient comes in with no significant change in urinary symptoms. He is voiding with a slow stream and frequency, nocturia x 1, no dysuria or hematuria. Patient continues to take alfuzosin and finasteride; he takes tadalafil with suboptimal results and is unable to maintain erections. He also reports low libido and is not sexually active. He does mention some premature ejaculation that is bothersome. PSA on 11/10/23 was 2.2 ng/mL (4.4ng/ml -corrected for finasteride) with a favorable 4K score of 7.0%. Testosterone level is normal.

## 2024-05-29 ENCOUNTER — NON-APPOINTMENT (OUTPATIENT)
Age: 64
End: 2024-05-29

## 2024-05-29 ENCOUNTER — APPOINTMENT (OUTPATIENT)
Dept: HEART AND VASCULAR | Facility: CLINIC | Age: 64
End: 2024-05-29
Payer: COMMERCIAL

## 2024-05-29 VITALS
SYSTOLIC BLOOD PRESSURE: 114 MMHG | BODY MASS INDEX: 29.12 KG/M2 | OXYGEN SATURATION: 97 % | HEART RATE: 91 BPM | WEIGHT: 215 LBS | DIASTOLIC BLOOD PRESSURE: 68 MMHG | HEIGHT: 72 IN

## 2024-05-29 DIAGNOSIS — I10 ESSENTIAL (PRIMARY) HYPERTENSION: ICD-10-CM

## 2024-05-29 DIAGNOSIS — E78.2 MIXED HYPERLIPIDEMIA: ICD-10-CM

## 2024-05-29 DIAGNOSIS — I25.10 ATHEROSCLEROTIC HEART DISEASE OF NATIVE CORONARY ARTERY W/OUT ANGINA PECTORIS: ICD-10-CM

## 2024-05-29 DIAGNOSIS — Z95.1 PRESENCE OF AORTOCORONARY BYPASS GRAFT: ICD-10-CM

## 2024-05-29 PROCEDURE — G2211 COMPLEX E/M VISIT ADD ON: CPT | Mod: NC,1L

## 2024-05-29 PROCEDURE — 93000 ELECTROCARDIOGRAM COMPLETE: CPT

## 2024-05-29 PROCEDURE — 99214 OFFICE O/P EST MOD 30 MIN: CPT | Mod: 25

## 2024-05-30 PROBLEM — Z95.1 S/P CABG (CORONARY ARTERY BYPASS GRAFT): Status: ACTIVE | Noted: 2019-07-09

## 2024-05-30 PROBLEM — E78.2 HYPERLIPIDEMIA, MIXED: Status: ACTIVE | Noted: 2019-07-09

## 2024-05-30 NOTE — REASON FOR VISIT
[Symptom and Test Evaluation] : symptom and test evaluation [FreeTextEntry1] : 63M with history of CAD s/p CABG comes in for a re-evaluation. No chest pain noted. Occasional dyspnea noted. No issues with medications. No recent testing. He gained a few lbs.

## 2024-05-30 NOTE — DISCUSSION/SUMMARY
[FreeTextEntry1] : SOB check echo and carotid us if able to approve and schedule. CAD cont ASA HTN - PARAMJIT  and [facility] had an extensive discussion regarding his blood pressure management. Patient will continue taking current medications in addition to maintaining a low Na diet, with periodic b/p checks at home. HLD PARAMJIT and [facility] discussed his lipid panel and individualized target LDL goal. At this point, will do diet and exercise with anticipation of re-evaluating labs in 3-6 months EKG section of the chart --- secondary to symptoms above an electrocardiogram also known as an EKG was performed.  Risks and benefits discussed with the patient. Patient was given time and privacy to changed into a gown. Shortly after, standard 10 leads were applied and a [employer] system was used to perform the study. The results were subsequently reviewed by attending physician and discussed with the patient. The study showed a normal sinus rhythm and no ST-T suggestive of ischemia. Order for the EKG was placed in the chart. The results were documented. Billing submitted. [EKG obtained to assist in diagnosis and management of assessed problem(s)] : EKG obtained to assist in diagnosis and management of assessed problem(s)

## 2024-10-11 ENCOUNTER — APPOINTMENT (OUTPATIENT)
Dept: HEART AND VASCULAR | Facility: CLINIC | Age: 64
End: 2024-10-11
Payer: COMMERCIAL

## 2024-10-11 VITALS
DIASTOLIC BLOOD PRESSURE: 64 MMHG | TEMPERATURE: 97 F | OXYGEN SATURATION: 98 % | SYSTOLIC BLOOD PRESSURE: 107 MMHG | WEIGHT: 210 LBS | HEART RATE: 81 BPM | HEIGHT: 72 IN | BODY MASS INDEX: 28.44 KG/M2

## 2024-10-11 DIAGNOSIS — N40.1 BENIGN PROSTATIC HYPERPLASIA WITH LOWER URINARY TRACT SYMPMS: ICD-10-CM

## 2024-10-11 DIAGNOSIS — Z87.898 PERSONAL HISTORY OF OTHER SPECIFIED CONDITIONS: ICD-10-CM

## 2024-10-11 DIAGNOSIS — R39.14 BENIGN PROSTATIC HYPERPLASIA WITH LOWER URINARY TRACT SYMPMS: ICD-10-CM

## 2024-10-11 DIAGNOSIS — Z95.1 PRESENCE OF AORTOCORONARY BYPASS GRAFT: ICD-10-CM

## 2024-10-11 DIAGNOSIS — M62.89 OTHER SPECIFIED DISORDERS OF MUSCLE: ICD-10-CM

## 2024-10-11 DIAGNOSIS — R35.1 NOCTURIA: ICD-10-CM

## 2024-10-11 DIAGNOSIS — R39.12 POOR URINARY STREAM: ICD-10-CM

## 2024-10-11 DIAGNOSIS — R68.82 DECREASED LIBIDO: ICD-10-CM

## 2024-10-11 DIAGNOSIS — F52.4 PREMATURE EJACULATION: ICD-10-CM

## 2024-10-11 DIAGNOSIS — I25.10 ATHEROSCLEROTIC HEART DISEASE OF NATIVE CORONARY ARTERY W/OUT ANGINA PECTORIS: ICD-10-CM

## 2024-10-11 DIAGNOSIS — Z87.438 PERSONAL HISTORY OF OTHER DISEASES OF MALE GENITAL ORGANS: ICD-10-CM

## 2024-10-11 DIAGNOSIS — E78.2 MIXED HYPERLIPIDEMIA: ICD-10-CM

## 2024-10-11 DIAGNOSIS — I10 ESSENTIAL (PRIMARY) HYPERTENSION: ICD-10-CM

## 2024-10-11 PROCEDURE — 99214 OFFICE O/P EST MOD 30 MIN: CPT

## 2024-10-11 PROCEDURE — G2211 COMPLEX E/M VISIT ADD ON: CPT | Mod: NC

## 2024-10-11 PROCEDURE — 93306 TTE W/DOPPLER COMPLETE: CPT

## 2024-10-11 PROCEDURE — 93880 EXTRACRANIAL BILAT STUDY: CPT

## 2024-10-31 ENCOUNTER — TRANSCRIPTION ENCOUNTER (OUTPATIENT)
Age: 64
End: 2024-10-31

## 2024-11-04 ENCOUNTER — TRANSCRIPTION ENCOUNTER (OUTPATIENT)
Age: 64
End: 2024-11-04

## 2024-12-06 ENCOUNTER — TRANSCRIPTION ENCOUNTER (OUTPATIENT)
Age: 64
End: 2024-12-06

## 2024-12-11 ENCOUNTER — APPOINTMENT (OUTPATIENT)
Dept: HEART AND VASCULAR | Facility: CLINIC | Age: 64
End: 2024-12-11

## 2024-12-13 ENCOUNTER — APPOINTMENT (OUTPATIENT)
Dept: UROLOGY | Facility: CLINIC | Age: 64
End: 2024-12-13
Payer: COMMERCIAL

## 2024-12-13 VITALS
BODY MASS INDEX: 28.48 KG/M2 | DIASTOLIC BLOOD PRESSURE: 77 MMHG | WEIGHT: 210 LBS | OXYGEN SATURATION: 98 % | HEART RATE: 78 BPM | TEMPERATURE: 97.3 F | SYSTOLIC BLOOD PRESSURE: 135 MMHG | RESPIRATION RATE: 16 BRPM

## 2024-12-13 DIAGNOSIS — N52.9 MALE ERECTILE DYSFUNCTION, UNSPECIFIED: ICD-10-CM

## 2024-12-13 DIAGNOSIS — R39.14 BENIGN PROSTATIC HYPERPLASIA WITH LOWER URINARY TRACT SYMPMS: ICD-10-CM

## 2024-12-13 DIAGNOSIS — R33.9 RETENTION OF URINE, UNSPECIFIED: ICD-10-CM

## 2024-12-13 DIAGNOSIS — N40.1 BENIGN PROSTATIC HYPERPLASIA WITH LOWER URINARY TRACT SYMPMS: ICD-10-CM

## 2024-12-13 PROCEDURE — 51741 ELECTRO-UROFLOWMETRY FIRST: CPT

## 2024-12-13 PROCEDURE — 51798 US URINE CAPACITY MEASURE: CPT

## 2024-12-13 PROCEDURE — 99214 OFFICE O/P EST MOD 30 MIN: CPT

## 2024-12-13 RX ORDER — SILODOSIN 8 MG/1
8 CAPSULE ORAL DAILY
Qty: 90 | Refills: 3 | Status: ACTIVE | COMMUNITY
Start: 2024-12-13 | End: 1900-01-01

## 2025-03-24 ENCOUNTER — RX RENEWAL (OUTPATIENT)
Age: 65
End: 2025-03-24

## 2025-04-11 ENCOUNTER — APPOINTMENT (OUTPATIENT)
Dept: UROLOGY | Facility: CLINIC | Age: 65
End: 2025-04-11

## 2025-04-22 NOTE — ASSESSMENT
[FreeTextEntry1] : 60 year old [place] single male with history of CAD s/p CABG x 5\par presents with:\par 1. erectile dysfunction\par 2. loss of libido\par 3. nocturia\par We discussed evaluation and impact of CAD on physiology and psychology of erectile function\par DIscussed  treatment options including PD 5-I, Intracavernosal therapy, erection vacuum device.\par Instructional materials provided to patient.\par Patient had previously attempt sildenafil on one occasion without good response\par Discussed PD5-I possible side effects, onset of action, duration of action, and food interactions.  Discussed behavioral strategies to optimize efficacy of medication.  Warned re priapism risk and need for immediate intervention if erection lasts more than 2 hours.  Patient instructed to report to an emergency room and explicitly inform staff of his condition and need for treatment.\par Patient was directed to [facility] Web site for additional information regarding condition, management and options\par \par Of note is the fact that patient previously had a T which was reported as normal .  The result was reported to 300.  Discussed MERARY Luo.  300 would warrant testosterone therapy.  Discussed \par We discussed recent data regarding increased risk of coronary plaque size, heart disease\par Will carry out endocrine evaluation\par Nocturia which began when started medication for ADD.  Discussed potential impact.  PVR 60 cc; Poor flow\par Discussed potential cessation.  Patient will review with neuropsychologist.\par \par Plan:\par 1. endocrine evaluation\par 2. sildenafil\par 3. Cessation of medication for ADD\par 4. consider T replacement\par followup post above\par \par \par 11.02.2020 \par patient returns complaining of:\par 1. loss of libido\par we discussed endocrine evaluation \par we discussed impact of T on libido\par we discussed negative impact of erectile dysfunction on sexual function\par \par 2. complaining of poor quality ejaculation\par \par 3. Erectile dysfunction \par we reviewed response to viagra with improved yet insufficient erectile function when utilized as directed. We discussed option of proceeding with diagnostic DUS with potential use of IC as treatment.\par \par 4. urinary symptoms have improved with the cessation of  medication ADD  \par Improved  urinary urgency improved off medication\par ipss 13; did not do flow\par \par We discussed the treatment of urinary symptoms and erectile dysfunction with daily tadalafil. \par We discussed potential for improved libido with improve daily "erectile responsiveness"\par Patient is reluctant to take multiple medication which makes this approach more attractive\par \par flow at next visit\par discussed need for preapproval
I have personally seen and examined this patient.  I have reviewed all pertinent clinical information and reviewed all relevant imaging and diagnostic studies personally.   I counseled the patient about diagnostic testing and treatment plan.   I discussed my recommendations with the primary team [place]

## 2025-04-25 ENCOUNTER — TRANSCRIPTION ENCOUNTER (OUTPATIENT)
Age: 65
End: 2025-04-25

## 2025-08-11 ENCOUNTER — TRANSCRIPTION ENCOUNTER (OUTPATIENT)
Age: 65
End: 2025-08-11